# Patient Record
Sex: MALE | Race: BLACK OR AFRICAN AMERICAN | Employment: OTHER | URBAN - METROPOLITAN AREA
[De-identification: names, ages, dates, MRNs, and addresses within clinical notes are randomized per-mention and may not be internally consistent; named-entity substitution may affect disease eponyms.]

---

## 2018-08-21 ENCOUNTER — OFFICE VISIT (OUTPATIENT)
Dept: SURGERY | Age: 58
End: 2018-08-21

## 2018-08-21 VITALS
WEIGHT: 262.2 LBS | TEMPERATURE: 97.9 F | HEART RATE: 61 BPM | DIASTOLIC BLOOD PRESSURE: 96 MMHG | BODY MASS INDEX: 38.83 KG/M2 | SYSTOLIC BLOOD PRESSURE: 140 MMHG | HEIGHT: 69 IN | RESPIRATION RATE: 20 BRPM | OXYGEN SATURATION: 98 %

## 2018-08-21 DIAGNOSIS — Z79.4 TYPE 2 DIABETES MELLITUS WITHOUT COMPLICATION, WITH LONG-TERM CURRENT USE OF INSULIN (HCC): ICD-10-CM

## 2018-08-21 DIAGNOSIS — E66.01 CLASS 2 SEVERE OBESITY DUE TO EXCESS CALORIES WITH SERIOUS COMORBIDITY AND BODY MASS INDEX (BMI) OF 39.0 TO 39.9 IN ADULT (HCC): Primary | ICD-10-CM

## 2018-08-21 DIAGNOSIS — E11.9 TYPE 2 DIABETES MELLITUS WITHOUT COMPLICATION, WITH LONG-TERM CURRENT USE OF INSULIN (HCC): ICD-10-CM

## 2018-08-21 PROBLEM — E66.9 OBESITY, CLASS II, BMI 35-39.9: Status: ACTIVE | Noted: 2018-08-21

## 2018-08-21 RX ORDER — METFORMIN HYDROCHLORIDE 1000 MG/1
1000 TABLET ORAL 2 TIMES DAILY WITH MEALS
COMMUNITY
End: 2019-07-27

## 2018-08-21 RX ORDER — HYDROCHLOROTHIAZIDE 12.5 MG/1
12.5 TABLET ORAL DAILY
COMMUNITY
End: 2020-01-17

## 2018-08-21 NOTE — MR AVS SNAPSHOT
[address] [facility] [id number] 
[phone] Patient: Rena Lester 
MRN: [id number] :1960 Visit Information Date & Time Provider Department Dept. Phone Encounter #  
 2018 10:40 AM Hilaria Hogan MD [address] [address] [id number] Follow-up Instructions Routing History Upcoming Health Maintenance Date Due Hepatitis C Screening 1960 HEMOGLOBIN A1C Q6M 1962 LIPID PANEL Q1 1962 FOOT EXAM Q1 1972 MICROALBUMIN Q1 1972 EYE EXAM RETINAL OR DILATED Q1 1972 Pneumococcal 19-64 Medium Risk (1 of 1 - PPSV23) 1981 DTaP/Tdap/Td series (1 - Tdap) 1983 FOBT Q 1 YEAR AGE 50-75 2012 Influenza Age 5 to Adult 2018 Allergies as of 2018  Review Complete On: 2018 By: Hilaria Hogan MD  
 No Known Allergies Current Immunizations  Never Reviewed No immunizations on file. Not reviewed this visit You Were Diagnosed With   
  
 Codes Comments Class 2 severe obesity due to excess calories with serious comorbidity and body mass index (BMI) of 39.0 to 39.9 in adult [facility])    -  Primary ICD-10-CM: E66.01, Z68.39 ICD-9-CM: 278.01, V85.39 Type 2 diabetes mellitus without complication, with long-term current use of insulin (HCC)     ICD-10-CM: E11.9, Z79.4 ICD-9-CM: 250.00, V58.67 Vitals BP Pulse Temp Resp Height(growth percentile) Weight(growth percentile) (!) 140/96 61 97.9 °F (36.6 °C) (Oral) 20 5' 8.5\" (1.74 m) 262 lb 3.2 oz (118.9 kg) SpO2 BMI Smoking Status 98% 39.29 kg/m2 Never Smoker Vitals History BMI and BSA Data Body Mass Index Body Surface Area  
 39.29 kg/m 2 2.4 m 2 Your Updated Medication List  
  
   
This list is accurate as of 18 11:37 AM.  Always use your most recent med list.  
  
  
  
  
 aspirin delayed-release 81 mg tablet Take  by mouth daily. hydroCHLOROthiazide 12.5 mg tablet Commonly known as:  HYDRODIURIL Take 12.5 mg by mouth daily. LANTUS U-100 INSULIN 100 unit/mL injection Generic drug:  insulin glargine 20 Units by SubCUTAneous route once. Liraglutide 0.6 mg/0.1 mL (18 mg/3 mL) Pnij Commonly known as:  VICTOZA  
0.6 mg by SubCUTAneous route. lisinopril 2.5 mg tablet Commonly known as:  Milena Primmer Take  by mouth daily. metFORMIN 1,000 mg tablet Commonly known as:  GLUCOPHAGE Take 1,000 mg by mouth two (2) times daily (with meals). ZOCOR 40 mg tablet Generic drug:  simvastatin Take  by mouth nightly. We Performed the Following REFERRAL TO [facility] [id number] Custom] Referral Information Referral ID Referred By Referred To  
  
 [id number] Tarsha Corbett Not Available Visits Status Start Date End Date 1 Open 8/21/18 8/21/19 If your referral has a status of pending review or denied, additional information will be sent to support the outcome of this decision. Introducing [facility]! [employer] introduces Zoop patient portal. Now you can access parts of your medical record, email your doctor's office, and request medication refills online. 1. In your internet browser, go to [email or web address] 2. Click on the First Time User? Click Here link in the Sign In box. You will see the New Member Sign Up page. 3. Enter your Zoop Access Code exactly as it appears below. You will not need to use this code after youve completed the sign-up process. If you do not sign up before the expiration date, you must request a new code. · Zoop Access Code: [email or web address] Expires: 11/19/2018 11:37 AM 
 
4. Enter the last four digits of your Social Security Number (xxxx) and Date of Birth (mm/dd/yyyy) as indicated and click Submit. You will be taken to the next sign-up page.  
5. Create a Complixt ID. This will be your Ketto login ID and cannot be changed, so think of one that is secure and easy to remember. 6. Create a Ketto password. You can change your password at any time. 7. Enter your Password Reset Question and Answer. This can be used at a later time if you forget your password. 8. Enter your e-mail address. You will receive e-mail notification when new information is available in [address] 9. Click Sign Up. You can now view and download portions of your medical record. 10. Click the Download Summary menu link to download a portable copy of your medical information. If you have questions, please visit the Frequently Asked Questions section of the Ketto website. Remember, Ketto is NOT to be used for urgent needs. For medical emergencies, dial 911. Now available from your iPhone and Android! Please provide this summary of care documentation to your next provider. Your primary care clinician is listed as Sweetie Elaine. If you have any questions after today's visit, please call [phone].

## 2018-08-21 NOTE — PROGRESS NOTES
Bariatric Surgery Consult    [address] is a 66 y.o. male with a history of severe. His Height: 5' 8.5\" (174 cm), Weight: 262 lb 3.2 oz (118.9 kg). Body mass index is 39.29 kg/(m^2). He reports that he has been trying to lose weight for 10 years. His maximum weight was 270 pounds. He has viewed our online seminar. [place] [facility] wants to consider laparoscopic gastric bypass surgery. Pt is referred by [facility]. Dietary History:   The patient says that in the past, physician supervised, unsupervised diets, Atkins, [place] and prescription diet pills have not resulted in real success. When asked why he was not able to achieve or maintain significant weight loss he replied, \"tried the Atkins for 6 months but there was not a significant loss\". Number of meals per day: 3  Portion size: medium  Snacks: 2-3x daily, e.g. Fruit and crackers  Other dietary indiscretions:    Fast food--4x a week, e.g. Martha Tuesdays. He reports 3-4 months ago he stopped eating out as much. Soda--n/a   Sweets--1x weekly, e.g. candy    Comorbidities:     Bariatric comorbidities present: diabetes, hypertension, hypercholesterolemia and weight related arthopathies     STOPBANG questionnaire: DEFERRED because he reports he had a sleep study done 2 years ago and it was negative. Ambulatory status: independent    The patient's reported level of exercise: moderately active. He reports he tries to get on his bike everyday if his knees allow it.      Past Medical History:   Diagnosis Date    Class 2 obesity due to excess calories with serious comorbidity in adult 8/21/2018    DM (diabetes mellitus) ([facility] [address]     Dyslipidemia     Hyperlipidemia     Hypertension     Obesity, Class II, BMI 35-39.9 8/21/2018    Renal insufficiency      Past Surgical History:   Procedure Laterality Date    CARDIAC CATHETERIZATION        Current Outpatient Prescriptions   Medication Sig    hydroCHLOROthiazide (HYDRODIURIL) 12.5 mg tablet Take 12.5 mg by mouth daily.  Liraglutide (VICTOZA) 0.6 mg/0.1 mL (18 mg/3 mL) pnij 0.6 mg by SubCUTAneous route.  metFORMIN (GLUCOPHAGE) 1,000 mg tablet Take 1,000 mg by mouth two (2) times daily (with meals).  aspirin delayed-release 81 mg tablet Take  by mouth daily.  lisinopril (PRINIVIL, ZESTRIL) 2.5 mg tablet Take  by mouth daily.  simvastatin (ZOCOR) 40 mg tablet Take  by mouth nightly.  insulin glargine (LANTUS) 100 unit/mL injection 20 Units by SubCUTAneous route once. No current facility-administered medications for this visit.        No Known Allergies   Social History   Substance Use Topics    Smoking status: Never Smoker    Smokeless tobacco: Never Used    Alcohol use No      Family History   Problem Relation Age of Onset    Diabetes Mother     Diabetes Brother     Heart Attack Brother       Review of Systems:  A comprehensive review of 12 systems was negative except for:   Constitutional: Diabetes  Eyes: negative  Ears, nose, mouth, throat, and face: negative  Respiratory: negative  Cardiovascular: negative  Gastrointestinal: negative  Genitourinary: negative  Integument/breast: negative  Hematologic/lymphatic: negative  Musculoskeletal: back trouble  Neurological: negative  Behvioral/Psych: negative    Objective:     Visit Vitals    BP (!) 140/96    Pulse 61    Temp 97.9 °F (36.6 °C) (Oral)    Resp 20    Ht 5' 8.5\" (1.74 m)    Wt 262 lb 3.2 oz (118.9 kg)    SpO2 98%    BMI 39.29 kg/m2      Physical Exam:  General:  alert, cooperative, no distress   Eyes:  conjunctivae and sclerae normal   Throat & Neck: no erythema or exudates noted and neck supple and symmetrical; no palpable masses  [place] class 3    Lungs:   clear to auscultation bilaterally   Heart:  Regular rate and rhythm   Abdomen:   obese, abdomen is soft without significant tenderness, masses, organomegaly or guarding, normal bowel sounds   Extremities: extremities normal, atraumatic, trace edema in lower extremities. Skin: Normal.   Neuro: Mental status: Alert, oriented, thought content appropriate  Gait: Normal   Lymphatic: no cervical or supraclavicular adenopathy        Assessment:   Diagnoses and all orders for this visit:    1. Class 2 severe obesity due to excess calories with serious comorbidity and body mass index (BMI) of 39.0 to 39.9 in adult ([facility] [address]  -     REFERRAL TO [address] [place] [address]    2. Type 2 diabetes mellitus without complication, with long-term current use of insulin ([facility])  -     [facility]    Severe (Body mass index is 39.29 kg/(m^2). ) with comorbidities including: diabetes, hypertension, hypercholesterolemia and weight related arthopathies. The patient meets criteria established by the NIH for weight loss surgery candidates. Without weight reduction, co-morbidities will escalate as well as increase risk of early mortality. Our recommendation is the patient could be served with laparoscopic gastric bypass surgery. I explained to the patient differences between laparoscopic gastric bypass, and laparoscopic vertical sleeve gastrectomy with respect to expected weight loss, resolution of comorbidities and risks. Mr. Deisy Pederson has viewed our online seminar and has seen our educational materials. He has requested Dr. Isabella Montalvo to perform his procedure. I reviewed the role for this procedure as a tool to help him achieve his weight loss goals. I reminded him that effective weight loss comes from lifelong adherence to changes in dietary choices, eating habits and exercise. Recommendation: We will initiate process for eligibility for laparoscopic gastric bypass surgery. He must complete insurance mandated diet and exercise counseling.  We also  recommend that the patient undergo the following evaluations prior to considering surgery:    Cardiology: n/a  Dietician: yes  Diabetes Center: yes  Gastroenterology: n/a  Psychiatry/Psychology: yes  Pulmonology: n/a  Sleep Medicine: n/a    Total face to face time with patient: 16 minutes. Greater than 50% of the time was spent in counseling.  11:14 AM - 11:30 AM  Chart was written by Fabiola Atkinson, as dictated by Monica Gallagher MD.      Signed By: Chele Lopez MD     August 21, 2018

## 2018-08-21 NOTE — PROGRESS NOTES
1. Have you been to the ER, urgent care clinic since your last visit? Hospitalized since your last visit? No    2. Have you seen or consulted any other health care providers outside of the [facility] since your last visit? Include any pap smears or colon screening. Kyra Mcintyre  Body composition    male  66 y.o. Vitals:    08/21/18 1053   Weight: 262 lb 3.2 oz (118.9 kg)   Height: 5' 8.5\" (1.74 m)     Body mass index is 39.29 kg/(m^2). Jyoti Prashant Neck-18.25 inches  Waist-48.5 inches  Hips-47.5 inches  Frame size-7 medium

## 2018-09-06 ENCOUNTER — CLINICAL SUPPORT (OUTPATIENT)
Dept: SURGERY | Age: 58
End: 2018-09-06

## 2018-09-06 DIAGNOSIS — E66.01 MORBID OBESITY WITH BMI OF 40.0-44.9, ADULT (HCC): Primary | ICD-10-CM

## 2018-09-11 ENCOUNTER — OP HISTORICAL/CONVERTED ENCOUNTER (OUTPATIENT)
Dept: OTHER | Age: 58
End: 2018-09-11

## 2018-09-11 VITALS — BODY MASS INDEX: 40.61 KG/M2 | WEIGHT: 271 LBS

## 2018-09-11 NOTE — PROGRESS NOTES
Pre-operative Bariatric Nutrition Evaluation ()     Date: 2018   Alea Koenig M.D. Name: Elizabeth Flor  :  1960  Age:  66  Gender: Male   Type of Surgery: [x]           Gastric Bypass  []           LAGB  []           Sleeve Gastrectomy    ASSESSMENT:    Past Medical History:HTN, Type II DM, high cholesterol, arthritis, depression, low vitamin D      Medications/Supplements:   Prior to Admission medications    Medication Sig Start Date End Date Taking? Authorizing Provider   hydroCHLOROthiazide (HYDRODIURIL) 12.5 mg tablet Take 12.5 mg by mouth daily. Historical Provider   Liraglutide (VICTOZA) 0.6 mg/0.1 mL (18 mg/3 mL) pnij 0.6 mg by SubCUTAneous route. Historical Provider   metFORMIN (GLUCOPHAGE) 1,000 mg tablet Take 1,000 mg by mouth two (2) times daily (with meals). Historical Provider   aspirin delayed-release 81 mg tablet Take  by mouth daily. Historical Provider   lisinopril (PRINIVIL, ZESTRIL) 2.5 mg tablet Take  by mouth daily. Historical Provider   simvastatin (ZOCOR) 40 mg tablet Take  by mouth nightly. Historical Provider   insulin glargine (LANTUS) 100 unit/mL injection 20 Units by SubCUTAneous route once. Historical Provider       Food Allergies/Intolerances:none    Anthropometrics:    Ht:68.5\"   Wt: 271#    IBW: 157#     %IBW: 172%     BMI:40     Category: obesity III     Reported wt history:Pt presents today for pre-op nutrition evaluation for wt loss surgery. Reports lowest adult BW was 150# at age 27 and highest adult BW was 275#. Attributes wt gain r/t decreased physical activity after retiring from the [employer] along with emotional eating and medical conditions that have caused wt gain. Pt has attempted wt loss through various methods with most significant wt loss of 10#. Has been unable to achieve more long term or significant wt loss and is now seeking approval for wt loss surgery.  Is motivated to lose weight to improve his health as he has had several family members pass away from weight related conditions. Pt will need to complete 6 months supervised weight loss for insurance requirements. Exercise/Physical Activity:bike riding for 5-10 miles, 3-4 times per week; weight training for 1 hour, 3-4 times per week    Reported Diet History:Atkins, diet pills, fasting, [place], exercise     24 Hour Diet Recall  Breakfast  Cereal with almond milk; eggs, cheese, turkey hodges on wknds   Lunch  Fast food or leftovers    Dinner  Fried chicken or fish, veggies; h/o ordering out pizza - recently cutting back on eating out    Snacks  Cookies, fruit, crackers, peanuts, candy    Beverages  Water,unsweetened tea with Weldon Copping      A pre-op nutrition checklist was reviewed. Patient checked off 5 of 15 items. Environment/Psychosocial/Support:pt's wife and daughter are listed as primary support system and rates level of support a 10 out of 10. Pt works full time and shares grocery shopping and cooking with his wife. NUTRITION DIAGNOSIS:  1. Excessive energy intake r/t food preferences evidenced by high caloric density food and snack choices. Heavy reliance on fast food/restaurant/take out meals with previous frequency of 4-5 times per week. Has recently started cutting back. 2. Food and nutrition related knowledge deficit r/t lack of prior exposure to information evidenced by pt seeking nutrition education in preparation for gastric bypass. NUTRITION INTERVENTION:  Pt educated on nutrition recommendations for weight loss surgery, specifically gastric bypass. Instructed on consuming 3 meals per day starting now. Use the balanced plate method to plan meals, include 3 oz of lean source of protein, 1/2 cup whole grains, unlimited non-starchy vegetables, 1/2 cup fruit and 1 serving of low fat dairy. Utilize handouts listing healthy snack and meal ideas to limit restaurant meals. After surgery measure all meals to 1/2 cup.  Each meal will contain a 1/4 cup lean protein and 1/4 cup fruit, non-starchy vegetable or starch (limiting to once per day). Aim for 60 g protein per day. Sip on 48-64 oz of sugar free, calorie free, non-carbonated beverages each day. Do not use a straw. Do not consume beverages 30 minutes before, during or 30 minutes after meals. Read all nutrition labels. Demonstrated and emphasized identifying serving size, total fat, sugar and protein content. Defined low fat as </= 3 g per serving. Discussed lean and extra lean sources of protein. Provided list of low fat cooking methods. Avoid foods with sugar listed in the first 3 ingredients and >/15 g sugar per serving. Excess sugar/fat intake may lead to dumping syndrome. Discussed signs and symptoms of dumping syndrome. Practice mindful eating habits; take small bites, chew thoroughly, avoid distractions, utilize hunger/fullness scale. Consume meals over 20-30 minutes. Attend Bariatric Support Group and increase physical activity (approved per MD) for long term weight maintenance. NUTRITION MONITORING AND EVALUATION:    The following goals were established with patient;  1. Continue to decrease reliance on fast food/take out meals. More cooking/prepping from home. Eventually limit eating out to only 2 times per week for best wt loss. 2. Improve overall food and snack choices. Include more lean protein, fruits, veggies, whole grains and low-fat dairy. 3. Maintain exercise and increase as tolerated. 4. Review nutrition guidelines for gastric bypass. Follow up next month for continued education and supervised weight loss. Specific tips and techniques to facilitate compliance with above recommendations were provided and discussed. Pt was strongly encourage to begin making necessary changes now, attend support group, and re-visit the dietitian prn. Nutrition evaluation reveals pt with some lifestyle changes in preparation for weight loss surgery.  Goals set and recommendations made. Will continue to assess as pt works to complete supervised weight loss requirements. If further details are desired please feel free to contact me at [phone]. This phone number was also provided to the patient for any further questions or concerns.            Cyndy Hall RD

## 2018-10-03 ENCOUNTER — CLINICAL SUPPORT (OUTPATIENT)
Dept: SURGERY | Age: 58
End: 2018-10-03

## 2018-10-03 VITALS — WEIGHT: 271 LBS | BODY MASS INDEX: 40.61 KG/M2

## 2018-10-03 DIAGNOSIS — E66.01 MORBID OBESITY WITH BMI OF 40.0-44.9, ADULT (HCC): Primary | ICD-10-CM

## 2018-10-03 NOTE — PROGRESS NOTES
[address] [facility] [address]  Super[facility]     Date:   10/3/2018    Patient's Name: Maricruz Orourke  : 1960    Insurance:  [facility]          Session:   Surgery: Gastric Bypass  Surgeon:  Jay Melgar M.D. Height: 68.5\"  Weight:    271      Lbs. BMI: 40   Pounds Lost since last month: 0               Pounds Gained since last month: 0    Starting Weight: 271#   Previous Months Weight: 271#  Overall Pounds Lost: 0  Overall Pounds Gained: 0    Other Pertinent Information: n/a     Smoking Status:  none  Alcohol Intake: 2-3 drinks once a month     I have reviewed with pt the guidelines of the supervised wt loss class. Pt understands the expectations of some wt loss during the wt loss trial.  Pt understands that wt gain could delay the process. I have also expressed to pt that classes need to be consecutive. Missing a class may subject pt to have to start over. Pt has received this information in writing. Changes that patient has made since last month include:  None reported. Eating Habits and Behaviors  A review of the general nutrition guidelines in preparation for weight loss surgery was provided. Pts were instructed that their plate should be made up 1/2 plate coming from non-starchy vegetables, 1/4 coming from lean meat, and 1/4 of their plate coming from carbohydrates, including fruits, starches, or milk. We discussed measuring meals to 1/2 cup total per meal after surgery. Emphasis was placed on the importance of eating 3 meals a day and aiming for 60 grams of protein per day and drinking only calorie-free, sugar-free and non-carbonated beverages. We discussed the importance of drinking 64 ounces of fluid per day to prevent dehydration post-operatively. Patient's current diet habits include: eating 2-3 meals per day. Snacking on fruit.  Eating crackers twice in the past month and cereal. Does not answer question for sweets/desserts intake. Eating a mixture of baked, grilled, broiled and some fried foods. Eating out is 1-3 times per week. Drinking 64 oz water, 24 oz unsweetened tea. Sometimes emotional eating and denies situational eating. Eating most meals while watching television or at work and takes 10-15 minutes to finish the meal. Reports grazing, lack of activity and food choices are biggest barriers to weight loss at this time. Physical Activity/Exercise  An education lesson specific to exercise was provided this month. We talked about the importance of increasing daily physical activity and beginning to develop an exercise regimen/routine. We discussed barriers to exercise and ways to work around those barriers. We talked about exercise as being an important part of long term weight loss after surgery. Comments:  During class, I discussed with patient the importance of getting into an exercise routine. Pt is currently officiating high school sports for activity. Pt has been encouraged to implement more routine/intentional exercise. Behavior Modification       We talked about how to eat more mindfully and identify emotional eating triggers. Tips and recommendations for how to make these changes were provided. Pt was encouraged to keep a food journal and record what they were taking in daily. Overall Assessment: Patient demonstrates appropriate lifestyle changes in preparation for weight loss surgery evidenced by weight maintenance. Continued changes are indicated. Will continue to assess as pt works to complete supervised weight loss requirements. Patient-Set Goals:   1. Nutrition - less sugar, increase protein  2. Exercise - bike, walking   3.  Behavior -less stress    Bianca Stearns, RAVEN  10/3/2018

## 2018-11-07 ENCOUNTER — CLINICAL SUPPORT (OUTPATIENT)
Dept: SURGERY | Age: 58
End: 2018-11-07

## 2018-11-07 DIAGNOSIS — E66.01 MORBID OBESITY WITH BMI OF 40.0-44.9, ADULT (HCC): Primary | ICD-10-CM

## 2018-11-08 VITALS — BODY MASS INDEX: 40.46 KG/M2 | WEIGHT: 270 LBS

## 2018-11-08 NOTE — PROGRESS NOTES
[id number] [facility] Surgery at [facility]  Supervised Weight Loss     Date:   2018    Patient's Name: Steve Michel  : 1960    Insurance:  [facility]          Session: 3 of  6  Surgery: Gastric Bypass  Surgeon:  Frederick Hein M.D. Height: 68.5\"  Weight:    270      Lbs. BMI: 40   Pounds Lost since last month: 1#               Pounds Gained since last month: 0    Starting Weight: 271#   Previous Months Weight: 271#  Overall Pounds Lost: 1#  Overall Pounds Gained: 0    Other Pertinent Information: n/a     Smoking Status:  none  Alcohol Intake: 1 or 2 drinks, once per week    I have reviewed with pt the guidelines of the supervised wt loss program.  Pt understands the expectations of some wt loss during the program and that wt gain could delay the process. I have also explained that classes need to be consecutive. Missing a class may result in starting over. Pt has received this information in writing. Changes that patient has made since last month include:  Medication compliance to improve A1c from 11.4% down to 7.6%, eating less sweets. Eating Habits and Behaviors  A nutrition lesson specific to portion control and meal planning was presented. Emphasis was placed on general healthy eating using the balanced plate method. Pts were instructed that their plate should be made up 1/2 plate coming from non-starchy vegetables, 1/4 coming from lean meat, and 1/4 of their plate coming from carbohydrates, including fruits, starches, or milk. We discussed measuring meals to 1/2 cup total per meal after surgery. Emphasis was placed on the importance of eating 3 meals a day and aiming for 60 grams of protein per day and drinking only calorie-free, sugar-free and non-carbonated beverages. We discussed the importance of drinking 64 ounces of fluid per day to prevent dehydration post-operatively. Patient's current diet habits include: eating 2 meals per day.  Snacking on fruit. Eating cereal daily. Does not report intake/frequency of sweets/desserts. Eating baked, grilled, broiled foods. Eating out is 1-3 times per week. Drinking 60 oz water. Sometimes emotional eating. Packing meals when away from home. Eating most meals while watching television and takes 10-15 minutes to finish the meal. Reports lack of activity as biggest barrier to wt loss at this time. Physical Activity/Exercise  We talked about the importance of increasing daily physical activity and beginning to develop an exercise regimen/routine. We talked about exercise as being an important part of long term weight loss after surgery. Comments:  During class, I discussed with patient the importance of getting into an exercise routine. Pt is currently not exercising stating \"lack of time/scheduling\"  for activity. Pt has been encouraged to make time for exercise and eventually work up to 150 minutes per week to promote wt loss. Behavior Modification       We talked about how to eat more mindfully and identify emotional eating triggers. Tips and recommendations for how to make these changes were provided. Pt was encouraged to keep a food journal and record what they were taking in daily. Overall Assessment: Patient demonstrates appropriate lifestyle changes in preparation for weight loss surgery evidenced by reported changes and weight loss. Continued changes are indicated specific to exercise and mindful eating behaviors. Will continue to assess as pt works to complete supervised weight loss requirements. Patient-Set Goals:   1. Nutrition - smaller portions  2. Exercise - early morning exercise for 45 minutes  3.  Behavior -consume less sugar     Rosaura Cano, RD  11/8/2018

## 2018-12-12 ENCOUNTER — CLINICAL SUPPORT (OUTPATIENT)
Dept: SURGERY | Age: 58
End: 2018-12-12

## 2018-12-12 DIAGNOSIS — E66.01 MORBID OBESITY WITH BMI OF 40.0-44.9, ADULT (HCC): Primary | ICD-10-CM

## 2018-12-14 VITALS — WEIGHT: 268 LBS | BODY MASS INDEX: 40.16 KG/M2

## 2018-12-14 NOTE — PROGRESS NOTES
[facility] Surgery at [facility]  Supervised Weight Loss     Date:   2018     Patient's Name: Charis Wolfe  : 1960    Insurance:  [facility]          Session:   Surgery: Gastric Bypass  Surgeon:  Alexus Salazar M.D. Height: 68.5\"  Weight:    268      Lbs. BMI: 40   Pounds Lost since last month: 2#               Pounds Gained since last month: 0    Starting Weight: 271#   Previous Months Weight: 270#  Overall Pounds Lost: 3#  Overall Pounds Gained: 0    Other Pertinent Information: n/a     Smoking Status:  Not reported  Alcohol Intake: not reported    I have reviewed with pt the guidelines of the supervised wt loss program.  Pt understands the expectations of some wt loss during the program and that wt gain could delay the process. I have also explained that classes need to be consecutive. Missing a class may result in starting over. Pt has received this information in writing. Changes that patient has made since last month include:  No changes reported. Eating Habits and Behaviors  A nutrition lesson specific to protein was presented. Emphasis was placed on importance of getting 60 grams of protein per day. We discussed various food sources of protein, use of protein shakes and eating protein first at the meal. General healthy eating guidelines were also discussed. Pts were instructed that their plate should be made up 1/2 plate coming from non-starchy vegetables, 1/4 coming from lean meat, and 1/4 of their plate coming from carbohydrates, including fruits, starches, or milk. We discussed measuring meals to 1/2 cup total per meal after surgery. Drinking only calorie-free, sugar-free and non-carbonated beverages. We discussed the importance of drinking 64 ounces of fluid per day to prevent dehydration post-operatively. Patient's current diet habits include: eating 2-3 meals per day. Snacking on fruit.  Does not report intake of refined carbohydrates, sweets, desserts. Eating cookies 1-2 times per month. Eating a mixture of baked, grilled, broiled and some fried foods. Eating out is not reported. Drinking 120 oz water. Sometimes emotional eating and denies situational eating. Packing meals when away from home. Eating most meals while watching television and takes 15 minutes to finish the meal. Reports food choices and portion sizes are biggest barriers to weight loss. Physical Activity/Exercise  We talked about the importance of increasing daily physical activity and beginning to develop an exercise regimen/routine. We talked about exercise as being an important part of long term weight loss after surgery. Comments:  During class, I discussed with patient the importance of getting into an exercise routine. Pt is currently \"officiating high school basketball\" for activity. Pt has been encouraged to maintain and increase exercise. Behavior Modification       We talked about how to eat more mindfully and identify emotional eating triggers. Tips and recommendations for how to make these changes were provided. Pt was encouraged to keep a food journal and record what they were taking in daily. Overall Assessment: Patient demonstrates some small changes mostly evidenced by weight loss as he does not report any lifestyle changes made. Pt does not answer all questions on diet/lifestyle questionnaire making it difficult to fully assess compliance with recommended changes. Will continue to assess. Patient-Set Goals:   1. Nutrition - less fried foods   2. Exercise - run/walk 5 miles non-stop  3.  Behavior -no goal set     Chuy Jack, [address] [place] [address]  12/14/2018

## 2019-01-15 ENCOUNTER — CLINICAL SUPPORT (OUTPATIENT)
Dept: SURGERY | Age: 59
End: 2019-01-15

## 2019-01-15 VITALS — BODY MASS INDEX: 39.71 KG/M2 | WEIGHT: 265 LBS

## 2019-01-15 DIAGNOSIS — E66.9 OBESITY (BMI 30-39.9): Primary | ICD-10-CM

## 2019-01-15 NOTE — PROGRESS NOTES
[id number] [facility] Surgery at [facility]  Supervised Weight Loss     Date:   1/15/2019    Patient's Name: Sabrina Ferrari  : 1960    Insurance:  [facility]          Session:   Surgery: Gastric Bypass  Surgeon:  Marely Soni M.D. Height: 68.5\"  Weight:    265      Lbs. BMI: 39   Pounds Lost since last month: 3#               Pounds Gained since last month: 0    Starting Weight: 271#   Previous Months Weight: 268#  Overall Pounds Lost: 6#  Overall Pounds Gained: 0    Other Pertinent Information: n/a    Smoking Status:  none  Alcohol Intake: 1 drink, every other week    I have reviewed with pt the guidelines of the supervised wt loss program.  Pt understands the expectations of some wt loss during the program and that wt gain could delay the process. I have also explained that classes need to be consecutive. Missing a class may result in starting over. Pt has received this information in writing. Changes that patient has made since last month include:  Lower carb intake, more protein, less eating out. Eating Habits and Behaviors  A nutrition lesson specific to vitamins was provided. We discussed the various reasons for needing vitamins and different types and doses. General healthy eating guidelines were also discussed. Pts were instructed that their plate should be made up 1/2 plate coming from non-starchy vegetables, 1/4 coming from lean meat, and 1/4 of their plate coming from carbohydrates, including fruits, starches, or milk. We discussed measuring meals to 1/2 cup total per meal after surgery. Drinking only calorie-free, sugar-free and non-carbonated beverages. We discussed the importance of drinking 64 ounces of fluid per day to prevent dehydration post-operatively. Patient's current diet habits include: eating 2-3 meals per day. Snacking on fruits and nuts. Eating cereal 5 times per week and no sweets/desserts.  Eating mixture of baked, grilled, broiled and fried foods. Eating out is 1-3 times per week. Drinking 64 oz water, 8 oz unsweetened tea. Reports sometimes emotional eating and reports puzzles as a non-food coping strategy. Denies situational eating. Is not packing meals when away from home. Eating most meals at a table and takes 10-15 minutes to finish the meal. Reports food choices and portion sizes are biggest barriers to wt loss at this time. Physical Activity/Exercise  We talked about the importance of increasing daily physical activity and beginning to develop an exercise regimen/routine. We talked about exercise as being an important part of long term weight loss after surgery. Comments:  During class, I discussed with patient the importance of getting into an exercise routine. Pt is currently not exercising stating \"nerve damage in shoulder\" that prevents activity. Pt has been encouraged to consider seated/chair exercises or walking. Behavior Modification       We talked about how to eat more mindfully and identify emotional eating triggers. Tips and recommendations for how to make these changes were provided. Pt was encouraged to keep a food journal and record what they were taking in daily. Overall Assessment: Patient demonstrates appropriate lifestyle changes in preparation for weight loss surgery evidenced by reported changes and weight loss. Continued changes are indicated. Will continue to assess as pt works to complete supervised weight loss requirements. Patient-Set Goals:   1. Nutrition - eating healthier, portion control   2. Exercise - walking and running   3.  Behavior -sipping water slowly    Poonam Rosenberg, RD  1/15/2019

## 2019-02-13 ENCOUNTER — CLINICAL SUPPORT (OUTPATIENT)
Dept: SURGERY | Age: 59
End: 2019-02-13

## 2019-02-13 VITALS — BODY MASS INDEX: 39.41 KG/M2 | WEIGHT: 263 LBS

## 2019-02-13 DIAGNOSIS — E66.9 OBESITY (BMI 30-39.9): Primary | ICD-10-CM

## 2019-02-13 NOTE — PROGRESS NOTES
[id number] [facility] Surgery at [facility]  Supervised Weight Loss     Date:   2019    Patient's Name: Rachel Ellis  : 1960    Insurance:  [facility]          Session:   Surgery: Gastric Bypass  Surgeon:  Vini Le M.D. Height: 68.5\"  Weight:    263      Lbs. BMI: 39   Pounds Lost since last month: 2#               Pounds Gained since last month: 0    Starting Weight: 271#   Previous Months Weight: 265#  Overall Pounds Lost: 8#  Overall Pounds Gained: 0    Other Pertinent Information: n/a     Smoking Status:  none  Alcohol Intake: not reported    I have reviewed with pt the guidelines of the supervised wt loss program.  Pt understands the expectations of some wt loss during the program and that wt gain could delay the process. I have also explained that classes need to be consecutive. Missing a class may result in starting over. Pt has received this information in writing. Changes that patient has made since last month include:  More exercise, more protein intake, increased fluid intake. Eating Habits and Behaviors  General healthy eating guidelines were also discussed. Pts were instructed that their plate should be made up 1/2 plate coming from non-starchy vegetables, 1/4 coming from lean meat, and 1/4 of their plate coming from carbohydrates, including fruits, starches, or milk. We discussed measuring meals to 1/2 cup total per meal after surgery. Drinking only calorie-free, sugar-free and non-carbonated beverages. We discussed the importance of drinking 64 ounces of fluid per day to prevent dehydration post-operatively. Patient's current diet habits include: eating 2-3 meals per day. Snacking on fruit and crackers. Eating crackers and cereal 1-2 times per week. Eating a mixture of baked, grilled, broiled and some fried foods. Eating out is 1-2 times per month. Drinking 60 oz water, 16 oz unsweetened tea.  Denies emotional and situational eating. Is not packing meals when away from home. Eating most meals while watching television and takes 15 minutes to finish the meal.         Physical Activity/Exercise  We talked about the importance of increasing daily physical activity and beginning to develop an exercise regimen/routine. We talked about exercise as being an important part of long term weight loss after surgery. Comments:  During class, I discussed with patient the importance of getting into an exercise routine. Pt is currently officiating high school basketball for activity. Pt has been encouraged to maintain and increase as tolerated. Eventually will need to incorporate additional exercise during off seasons. Behavior Modification       A behavior modification lesson was provided with an emphasis on developing mindful eating behaviors. We talked about how to eat more mindfully and identify emotional eating triggers. Tips and recommendations for how to make these changes were provided. Pt was encouraged to keep a food journal and record what they were taking in daily. Overall Assessment: Patient demonstrates appropriate lifestyle changes in preparation for weight loss surgery evidenced by reported changes and weight loss. Demonstrates fairly good understanding of general nutrition guidelines and appears to be an appropriate candidate for surgery at this time. Patient-Set Goals:   1. Nutrition - continue with protein increase   2. Exercise - increase time and length of exercise   3.  Behavior -eat at the dinner table, be more mindful    Robert Escamilla, RAVEN  2/13/2019

## 2019-04-23 ENCOUNTER — HOSPITAL ENCOUNTER (OUTPATIENT)
Dept: DIABETES SERVICES | Age: 59
Discharge: HOME | End: 2019-04-23
Payer: OTHER GOVERNMENT

## 2019-04-23 PROCEDURE — G0108 DIAB MANAGE TRN  PER INDIV: HCPCS

## 2019-04-23 NOTE — DIABETES MGMT
[facility]   Pre[facility]operative Bariatric Diabetes Assessment     2019    Referring Physician/Surgeon:   Radha Cerda M.D.   NAME: Ronna Perez : 1960 AGE: 64 y.o. GENDER: male  REASON FOR VISIT:  laparoscopic gastric bypass surgery    Assessment:        Past Medical History:   Diagnosis Date    Class 2 obesity due to excess calories with serious comorbidity in adult 2018    DM (diabetes mellitus) ([facility] [address]     Dyslipidemia     Hyperlipidemia     Hypertension     Obesity, Class II, BMI 35-39.9 2018    Renal insufficiency        Diabetes Medications:   Aleshia Stevens is currently taking 90 units of Lantus in the AM, 1.8 mg of Victoza in the AM and 1000 mg of Metformin in the AM and PM. He reports that he may miss his medication once a week, but he normally does not forget to take it. We reviewed the mechanism of action of each of his medications as well as the best way to take his medications. He has been taking 80 units of Lantus in one shot and 10 units right after. He agreed to begin taking 45 and 45 to help him with absorption. We reviewed proper site rotation as well as the importance of cleaning the sites. He reports some signs of lipohypertrophy, for which we reviewed appropriate locations in inject insulin. Vitamins:   None reported at this time    Food Allergies/Intolerances: Patient reports an intolerance to most pork products    Exercise/Physical Activity: Mr. Ines Schwartz reports that he is exercising every day. He will either ride his bike (outside) for an hour or lift weight (3x/week) for 30-45 minutes. He is lifting weight to \"help tighten his skin\" pre-surgery. We reviewed the benefits of exercise on his BG and he was encouraged to continue with the same lifestyle modifications moving forward.      Anthropometrics:   19  Height: 69 inches  Weight: 265.8 lb   BMI: 39.25 kg/M2       Laboratory:  His POC A1C in the office today was 7.5%, down from 9.4% in March of 2019. We reviewed his results with him, as well as what it represents and his target range. He verbalized understanding. Reported Diabetes History:  Pt unsure when he was diagnosed with diabetes, however stated he has had it for at least 12 years now. He is unsure of his highest A1C, however he knows in March it was elevated. He has multiple family members that have diabetes, and reports that he is angry about his diabetes as he is lost his baby brother and mother to diabetes. Aramis Ardon reports checking his BG once a day, always fasting in the morning. He is using the Precision Xtra meter, however did not bring his meter or log book for review today. He self reported that his fasting number this morning was 97 mg/dL and his 7 day average is 107 mg/dL. He stated the highest number he has seen recently is 232 mg/dL because he ate a lot of pizza the night before. He is not currently keeping a log book but understands the benefit of this and agreed to begin keeping a log book. He is to email me his values from the last 7-10 days for evaluation. We review his target ranges for pre and post meals and he verbalized understanding. We also discussed the various factors that could be affecting his BG.     24 hour Diet recall:  Breakfast: Cereal with almond milk  Lunch: Sometimes he skips lunch (if he has it he will have left overs from the night before)  Dinner: Trying to follow MyPlate method that he learned from Shayy Singh (including a protein + carb + vegetable)  Snacks: mostly snacking on fruits (banana, apples, etc)  Beverages: Water, Gatorade zero (no SSB)    **Patient reports that he has been eating the same thing almost every day. Weekends he reports he is eating differently due to not being at work. Him and his wife are both in the process of getting the bariatric surgery and so they are making these lifestyle modifications together.  We reviewed the importance of him not skipping meals, as he tends to over eat when he skips meals. We spent a good portion of today's visit helping him identify all sources of food that contain carbohydrates as he was not sure of all foods. Bryce Bertrand likes to bake, however has substituted his sugar for sugar substitute to help control his BG values. One of his biggest concerns is that he is eating while watching TV--he understands how this may lead to over consumption of food and agreed to be more mindful of this moving forward. Nutrition Diagnosis:  Inconsistent carbohydrate intake consistent with lack of knowledge on carbohydrate foods supported by pt reported diet recall as well as patient skipping meals         Nutrition/Diabetes Intervention:  Participant educated on diabetes recommendations prior to weight loss surgery, specifically:     .  -Instructed on consuming 3 meals per day, consistent in carbohydrate, starting now. He is to have between 45-60 grams of carbs per meal. Use the balanced plate method along with carbohydrate counting to plan meals and  Include lean protein at each meal and include 1/2 plate of non starchy vegetables at lunch and dinner. Limit snacks to when hungry or > 6 hours without a meal. He was given a snack list of appropriate snack options moving forward. -Use only sugar  free, calorie free, non-carbonated beverages each day. Do not use a straw. Provided written guidelines and acceptable list of protein supplements.     -Read all nutrition labels and measure all foods. Demonstrated and emphasized identifying serving size, total carbohydrate and fiber. Provided written resource for unlabeled foods and online resources for dining out. Reese Vital was able to return demonstration of reading food labels with minimal assistance.    -Discussed how to properly treat a low blood sugar using the Rule of 15. Reese Vital reports having 1-2 episodes of hypoglycemia in the last month. He treated his lows with 2 glucose tablets and was not re-checking his BG.  We reviewed the proper treatment method to include the importance of re-checking BG to ensure it is within target range. He verbalized understanding.    -Reviewed blood sugar goals pre and post meal along with frequency of testing. Participant to check blood glucose levels 1-2 times per day and to continue all diabetes medications unless instructed otherwise by MD.      Monitoring and Evalution:    After review of General Eating Tips handout participant chose to add the following goals:  1) I eat breakfast, lunch and dinner. 2) I avoid distractions while eating. 3) I measure all foods. Pt able to check off 12 out of 16 pre surgery check list items at conclusion of our visit today. Plan:     Specific tips and techniques to facilitate compliance with above recommendations were provided and discussed. Participant was encouraged to begin making necessary changes now       [x]      Participant appears appropriate for surgery at this time       My phone number and e-mail was provided to the participant for any further questions or concerns.       [address]

## 2019-05-29 ENCOUNTER — OFFICE VISIT (OUTPATIENT)
Dept: SURGERY | Age: 59
End: 2019-05-29

## 2019-05-29 VITALS
TEMPERATURE: 98.3 F | DIASTOLIC BLOOD PRESSURE: 87 MMHG | HEART RATE: 74 BPM | HEIGHT: 69 IN | RESPIRATION RATE: 18 BRPM | BODY MASS INDEX: 38.95 KG/M2 | OXYGEN SATURATION: 98 % | WEIGHT: 263 LBS | SYSTOLIC BLOOD PRESSURE: 143 MMHG

## 2019-05-29 DIAGNOSIS — Z79.4 TYPE 2 DIABETES MELLITUS WITHOUT COMPLICATION, WITH LONG-TERM CURRENT USE OF INSULIN (HCC): ICD-10-CM

## 2019-05-29 DIAGNOSIS — E11.9 TYPE 2 DIABETES MELLITUS WITHOUT COMPLICATION, WITH LONG-TERM CURRENT USE OF INSULIN (HCC): ICD-10-CM

## 2019-05-29 DIAGNOSIS — E66.01 CLASS 2 SEVERE OBESITY DUE TO EXCESS CALORIES WITH SERIOUS COMORBIDITY AND BODY MASS INDEX (BMI) OF 39.0 TO 39.9 IN ADULT (HCC): Primary | ICD-10-CM

## 2019-05-29 NOTE — LETTER
5/29/19 Patient: Rena Lester  
YOB: 1960 Date of Visit: 5/29/2019 Jacqueline Huang MD 
[address] [id number] [phone] [facility] [address] VIA Facsimile: [phone] Dear Jacqueline Huang MD, Thank you for referring [facility] for evaluation. My notes for this consultation are attached. If you have questions, please do not hesitate to call me. I look forward to following your patient along with you. Sincerely, Josi Cortes MD

## 2019-05-29 NOTE — PROGRESS NOTES
Bariatric Surgery Consult    [address] Palumbo is a 64 y.o. male with a history of morbid obesity. His Height: 5' 8.5\" (174 cm), Weight: 263 lb (119.3 kg). Body mass index is 39.41 kg/m². He reports that he has been trying to lose weight for 5 years. His maximum weight was 280 pounds. He has attended our bariatric surgery information seminar. Renetta Pack wants to consider laparoscopic gastric bypass surgery. Pt is referred by:  Edna Beltran MD.      Comorbidities:     Bariatric comorbidities present: hypertension, insulin dependent diabetes and obstructive sleep apnea    Ambulatory status: independent    The patient's reported level of exercise: moderately active. Patient Active Problem List    Diagnosis Date Noted    Class 2 obesity due to excess calories with serious comorbidity in adult 08/21/2018    Obesity, Class II, BMI 35-39.9 08/21/2018    Hypertension     DM (diabetes mellitus) ([address]     Hyperlipidemia     Renal insufficiency     Dyslipidemia      Past Medical History:   Diagnosis Date    Class 2 obesity due to excess calories with serious comorbidity in adult 8/21/2018    [facility]     Dyslipidemia     Hyperlipidemia     Hypertension     Obesity, Class II, BMI 35-39.9 8/21/2018    Renal insufficiency       Past Surgical History:   Procedure Laterality Date    CARDIAC CATHETERIZATION        Social History     Tobacco Use    Smoking status: Never Smoker    Smokeless tobacco: Never Used   Substance Use Topics    Alcohol use: No      Family History   Problem Relation Age of Onset    Diabetes Mother     Diabetes Brother     Heart Attack Brother       . Current Outpatient Medications   Medication Sig    hydroCHLOROthiazide (HYDRODIURIL) 12.5 mg tablet Take 12.5 mg by mouth daily.  Liraglutide (VICTOZA) 0.6 mg/0.1 mL (18 mg/3 mL) pnij 0.6 mg by SubCUTAneous route.  metFORMIN (GLUCOPHAGE) 1,000 mg tablet Take 1,000 mg by mouth two (2) times daily (with meals).     aspirin delayed-release 81 mg tablet Take  by mouth daily.  lisinopril (PRINIVIL, ZESTRIL) 2.5 mg tablet Take  by mouth daily.  simvastatin (ZOCOR) 40 mg tablet Take  by mouth nightly.  insulin glargine (LANTUS) 100 unit/mL injection 20 Units by SubCUTAneous route once. No current facility-administered medications for this visit. No Known Allergies      Review of Systems:    Constitutional: negative  Ears, Nose, Mouth, Throat, and Face: negative  Respiratory: negative  Cardiovascular: negative  Gastrointestinal: negative  Genitourinary:negative  Integument/Breast: negative  Hematologic/Lymphatic: negative  Musculoskeletal:negative  Neurological: negative  Behavioral/Psychiatric: negative  Endocrine: negative  Allergic/Immunologic: negative    Objective:     Visit Vitals  /87 (BP 1 Location: Left arm, BP Patient Position: Sitting)   Pulse 74   Temp 98.3 °F (36.8 °C) (Oral)   Resp 18   Ht 5' 8.5\" (1.74 m)   Wt 263 lb (119.3 kg)   SpO2 98%   BMI 39.41 kg/m²        Physical Exam:    General:  alert, no distress, morbidly obese   Eyes:  conjunctivae and sclerae normal, pupils equal, round, reactive to light, extraocular movements intact without nystagmus   Throat & Neck: no erythema or exudates noted and neck supple and symmetrical; no palpable masses   Lungs:   clear to auscultation bilaterally   Heart:  Regular rate and rhythm   Abdomen:   obese, soft, nontender, nondistended, no masses or organomegaly,    Extremities: no edema,  no gait disturbances   Skin: Normal.       Assessment:     1. Morbid obesity (Body mass index is 39.41 kg/m².) with multiple comorbidities. The patient meets criteria established by the NIH for weight loss surgery candidates. Without weight reduction, co-morbidities will escalate as well as increase risk of early mortality. Our recommendation is the patient could be served with laparoscopic gastric bypass surgery.  I explained to the patient differences between laparoscopic gastric bypass, laparoscopic adjustable gastric banding, and laparoscopic vertical sleeve gastrectomy with respect to expected weight loss, resolution of comorbidities and risks. Mr. Chaparro Dumont has attended one our informational meetings and has seen our educational materials. He has requested Dr. Stephen Bhardwaj to perform his procedure. I reviewed the role for this procedure as a tool to help him achieve his weight loss goals. I reminded him that effective weight loss comes from lifelong adherence to changes in dietary choices, eating habits and exercise. Recommendation: We will request approval for laparoscopic gastric bypass surgery. He is a good candidate for gastric bypass. Signed By: Trisha Maier MD     May 29, 2019       Greater than half of the time: 40 minutes was used in counciling the patient about bariatric surgery and the steps she needs to take to move forward with her surgery. Mr. Chaparro Dumont has a reminder for a \"due or due soon\" health maintenance. I have asked that he contact his primary care provider for follow-up on this health maintenance.

## 2019-05-29 NOTE — PROGRESS NOTES
1. Have you been to the ER, urgent care clinic since your last visit? Hospitalized since your last visit? No    2. Have you seen or consulted any other health care providers outside of the [address] [place] [address] since your last visit? Include any pap smears or colon screening.  No

## 2019-06-06 DIAGNOSIS — Z01.818 PRE-OP EXAM: Primary | ICD-10-CM

## 2019-07-08 ENCOUNTER — HOSPITAL ENCOUNTER (OUTPATIENT)
Dept: ULTRASOUND IMAGING | Age: 59
Discharge: HOME | End: 2019-07-08
Attending: NURSE PRACTITIONER
Payer: OTHER GOVERNMENT

## 2019-07-08 ENCOUNTER — HOSPITAL ENCOUNTER (OUTPATIENT)
Dept: PREADMISSION TESTING | Age: 59
Discharge: HOME | End: 2019-07-08
Attending: NURSE PRACTITIONER
Payer: OTHER GOVERNMENT

## 2019-07-08 ENCOUNTER — HOSPITAL ENCOUNTER (OUTPATIENT)
Dept: GENERAL RADIOLOGY | Age: 59
Discharge: HOME | End: 2019-07-08
Attending: NURSE PRACTITIONER
Payer: OTHER GOVERNMENT

## 2019-07-08 ENCOUNTER — OFFICE VISIT (OUTPATIENT)
Dept: SURGERY | Age: 59
End: 2019-07-08

## 2019-07-08 VITALS
WEIGHT: 255 LBS | RESPIRATION RATE: 18 BRPM | HEART RATE: 58 BPM | DIASTOLIC BLOOD PRESSURE: 67 MMHG | BODY MASS INDEX: 37.77 KG/M2 | TEMPERATURE: 98 F | OXYGEN SATURATION: 98 % | SYSTOLIC BLOOD PRESSURE: 115 MMHG | HEIGHT: 69 IN

## 2019-07-08 VITALS
HEART RATE: 58 BPM | OXYGEN SATURATION: 99 % | DIASTOLIC BLOOD PRESSURE: 67 MMHG | BODY MASS INDEX: 37.81 KG/M2 | WEIGHT: 255.25 LBS | SYSTOLIC BLOOD PRESSURE: 115 MMHG | HEIGHT: 69 IN | TEMPERATURE: 98 F

## 2019-07-08 DIAGNOSIS — Z01.818 PRE-OP EXAM: ICD-10-CM

## 2019-07-08 DIAGNOSIS — N28.9 RENAL INSUFFICIENCY: ICD-10-CM

## 2019-07-08 DIAGNOSIS — R79.89 ABNORMAL THYROID STIMULATING HORMONE (TSH) LEVEL: ICD-10-CM

## 2019-07-08 DIAGNOSIS — E11.69 DIABETES MELLITUS TYPE 2 IN OBESE (HCC): ICD-10-CM

## 2019-07-08 DIAGNOSIS — E78.2 MIXED HYPERLIPIDEMIA: ICD-10-CM

## 2019-07-08 DIAGNOSIS — E66.9 OBESITY, CLASS II, BMI 35-39.9: Primary | ICD-10-CM

## 2019-07-08 DIAGNOSIS — E66.9 DIABETES MELLITUS TYPE 2 IN OBESE (HCC): ICD-10-CM

## 2019-07-08 PROBLEM — E66.01 SEVERE OBESITY (HCC): Status: ACTIVE | Noted: 2019-07-08

## 2019-07-08 LAB
25(OH)D3 SERPL-MCNC: 46.2 NG/ML (ref 30–100)
ALBUMIN SERPL-MCNC: 3.9 G/DL (ref 3.5–5)
ALBUMIN/GLOB SERPL: 1.2 {RATIO} (ref 1.1–2.2)
ALP SERPL-CCNC: 79 U/L (ref 45–117)
ALT SERPL-CCNC: 38 U/L (ref 12–78)
ANION GAP SERPL CALC-SCNC: 4 MMOL/L (ref 5–15)
APPEARANCE UR: CLEAR
AST SERPL-CCNC: 29 U/L (ref 15–37)
ATRIAL RATE: 53 BPM
BACTERIA URNS QL MICRO: NEGATIVE /HPF
BASOPHILS # BLD: 0.1 K/UL (ref 0–0.1)
BASOPHILS NFR BLD: 1 % (ref 0–1)
BILIRUB SERPL-MCNC: 1.1 MG/DL (ref 0.2–1)
BILIRUB UR QL: NEGATIVE
BUN SERPL-MCNC: 20 MG/DL (ref 6–20)
BUN/CREAT SERPL: 13 (ref 12–20)
CALCIUM SERPL-MCNC: 9.2 MG/DL (ref 8.5–10.1)
CALCULATED P AXIS, ECG09: 48 DEGREES
CALCULATED R AXIS, ECG10: -2 DEGREES
CALCULATED T AXIS, ECG11: -6 DEGREES
CHLORIDE SERPL-SCNC: 107 MMOL/L (ref 97–108)
CO2 SERPL-SCNC: 31 MMOL/L (ref 21–32)
COLOR UR: NORMAL
CREAT SERPL-MCNC: 1.52 MG/DL (ref 0.7–1.3)
DIAGNOSIS, 93000: NORMAL
DIFFERENTIAL METHOD BLD: ABNORMAL
EOSINOPHIL # BLD: 0.2 K/UL (ref 0–0.4)
EOSINOPHIL NFR BLD: 5 % (ref 0–7)
EPITH CASTS URNS QL MICRO: NORMAL /LPF
ERYTHROCYTE [DISTWIDTH] IN BLOOD BY AUTOMATED COUNT: 15.4 % (ref 11.5–14.5)
EST. AVERAGE GLUCOSE BLD GHB EST-MCNC: 163 MG/DL
GLOBULIN SER CALC-MCNC: 3.3 G/DL (ref 2–4)
GLUCOSE SERPL-MCNC: 89 MG/DL (ref 65–100)
GLUCOSE UR STRIP.AUTO-MCNC: NEGATIVE MG/DL
HBA1C MFR BLD: 7.3 % (ref 4.2–6.3)
HCT VFR BLD AUTO: 45.4 % (ref 36.6–50.3)
HGB BLD-MCNC: 13 G/DL (ref 12.1–17)
HGB UR QL STRIP: NEGATIVE
HYALINE CASTS URNS QL MICRO: NORMAL /LPF (ref 0–5)
IMM GRANULOCYTES # BLD AUTO: 0 K/UL (ref 0–0.04)
IMM GRANULOCYTES NFR BLD AUTO: 0 % (ref 0–0.5)
IRON SERPL-MCNC: 75 UG/DL (ref 35–150)
KETONES UR QL STRIP.AUTO: NEGATIVE MG/DL
LEUKOCYTE ESTERASE UR QL STRIP.AUTO: NEGATIVE
LYMPHOCYTES # BLD: 2.2 K/UL (ref 0.8–3.5)
LYMPHOCYTES NFR BLD: 44 % (ref 12–49)
MCH RBC QN AUTO: 22.2 PG (ref 26–34)
MCHC RBC AUTO-ENTMCNC: 28.6 G/DL (ref 30–36.5)
MCV RBC AUTO: 77.5 FL (ref 80–99)
MONOCYTES # BLD: 0.3 K/UL (ref 0–1)
MONOCYTES NFR BLD: 6 % (ref 5–13)
NEUTS SEG # BLD: 2.1 K/UL (ref 1.8–8)
NEUTS SEG NFR BLD: 44 % (ref 32–75)
NITRITE UR QL STRIP.AUTO: NEGATIVE
NRBC # BLD: 0 K/UL (ref 0–0.01)
NRBC BLD-RTO: 0 PER 100 WBC
P-R INTERVAL, ECG05: 142 MS
PH UR STRIP: 6 [PH] (ref 5–8)
PLATELET # BLD AUTO: 252 K/UL (ref 150–400)
PMV BLD AUTO: 11.4 FL (ref 8.9–12.9)
POTASSIUM SERPL-SCNC: 4.1 MMOL/L (ref 3.5–5.1)
PROT SERPL-MCNC: 7.2 G/DL (ref 6.4–8.2)
PROT UR STRIP-MCNC: NEGATIVE MG/DL
Q-T INTERVAL, ECG07: 430 MS
QRS DURATION, ECG06: 84 MS
QTC CALCULATION (BEZET), ECG08: 403 MS
RBC # BLD AUTO: 5.86 M/UL (ref 4.1–5.7)
RBC #/AREA URNS HPF: NORMAL /HPF (ref 0–5)
RBC MORPH BLD: ABNORMAL
RBC MORPH BLD: ABNORMAL
SODIUM SERPL-SCNC: 142 MMOL/L (ref 136–145)
SP GR UR REFRACTOMETRY: 1.03 (ref 1–1.03)
TSH SERPL DL<=0.05 MIU/L-ACNC: 0.22 UIU/ML (ref 0.36–3.74)
UA: UC IF INDICATED,UAUC: NORMAL
UROBILINOGEN UR QL STRIP.AUTO: 0.2 EU/DL (ref 0.2–1)
VENTRICULAR RATE, ECG03: 53 BPM
WBC # BLD AUTO: 4.9 K/UL (ref 4.1–11.1)
WBC URNS QL MICRO: NORMAL /HPF (ref 0–4)

## 2019-07-08 PROCEDURE — 80053 COMPREHEN METABOLIC PANEL: CPT

## 2019-07-08 PROCEDURE — 93005 ELECTROCARDIOGRAM TRACING: CPT

## 2019-07-08 PROCEDURE — 76700 US EXAM ABDOM COMPLETE: CPT

## 2019-07-08 PROCEDURE — 36415 COLL VENOUS BLD VENIPUNCTURE: CPT

## 2019-07-08 PROCEDURE — 86677 HELICOBACTER PYLORI ANTIBODY: CPT

## 2019-07-08 PROCEDURE — 83036 HEMOGLOBIN GLYCOSYLATED A1C: CPT

## 2019-07-08 PROCEDURE — 84443 ASSAY THYROID STIM HORMONE: CPT

## 2019-07-08 PROCEDURE — 85025 COMPLETE CBC W/AUTO DIFF WBC: CPT

## 2019-07-08 PROCEDURE — 71046 X-RAY EXAM CHEST 2 VIEWS: CPT

## 2019-07-08 PROCEDURE — 83540 ASSAY OF IRON: CPT

## 2019-07-08 PROCEDURE — 82306 VITAMIN D 25 HYDROXY: CPT

## 2019-07-08 PROCEDURE — 81001 URINALYSIS AUTO W/SCOPE: CPT

## 2019-07-08 RX ORDER — POLYETHYLENE GLYCOL 3350 17 G/17G
17 POWDER, FOR SOLUTION ORAL DAILY
Qty: 1530 G | Refills: 2 | Status: SHIPPED | OUTPATIENT
Start: 2019-07-08

## 2019-07-08 RX ORDER — ONDANSETRON 4 MG/1
4 TABLET, ORALLY DISINTEGRATING ORAL
Qty: 20 TAB | Refills: 0 | Status: SHIPPED | OUTPATIENT
Start: 2019-07-08 | End: 2020-01-17

## 2019-07-08 RX ORDER — FAMOTIDINE 40 MG/1
40 TABLET, FILM COATED ORAL DAILY
Qty: 30 TAB | Refills: 0 | Status: SHIPPED | OUTPATIENT
Start: 2019-07-08 | End: 2020-04-17

## 2019-07-08 RX ORDER — ACETAMINOPHEN 500 MG
1000 TABLET ORAL
COMMUNITY

## 2019-07-08 RX ORDER — GLUCOSAMINE SULFATE 1500 MG
2000 POWDER IN PACKET (EA) ORAL DAILY
COMMUNITY
End: 2020-04-17

## 2019-07-08 NOTE — PERIOP NOTES
PAT instructions reviewed with patient and family; and given the opportunity to ask questions. Patient given surgical site information FAQs handout and reviewed. Patient given two bottles CHG soap and instruction sheet, instructions for use reviewed with patient. Patient advised to notify Dr. Swapna Barron office re: currently has toothache and scheduled to see dentist on 7-10-19.

## 2019-07-08 NOTE — PATIENT INSTRUCTIONS
Day Before Surgery:   -  take (2) Extra Strength Tylenol (acetaminophen) at noon and 8 pm    -  you may drink sugar free clear liquids until 3 hours prior to surgery      your after surgery prescriptions BEFORE surgery     Make your 2, 4 and 6 week appointments for after surgery     Sign up for My Chart     Stop the METFORMIN 3 days prior to surgery           Learning About How to Prepare for Weight-Loss Surgery  How can you prepare for weight-loss surgery? Having weight-loss surgery (also called bariatric surgery) is a big step. You can prepare for surgery by having a plan. Your plan may include your goals for losing weight and how to makes changes in your diet, activity, and lifestyle to help raise your chances of success. One way to prepare for surgery is to think about your goal or reason why you want to reach a healthy weight. Do you want to lower your blood pressure, cholesterol, or blood sugar? Do you want to be able to sleep better, play with your kids, or walk around the block? Having a reason can help you stay with your plan and meet your goals. Your weight-loss surgery team can help you meet your goals and get ready for surgery. Sylvia Reed work with a team that's trained to help you lose weight and make healthy changes in your life. This team may include:  · A medical doctor or nurse to help manage your care and schedule tests before surgery. · A surgeon who specializes in weight-loss surgery. · A registered dietitian to help you plan meals and make changes in the way you eat. · An exercise specialist to help you be more active and get stronger. · A therapist or counselor to help you learn why you eat and teach you ways to deal with stress and your emotions. Your team will also be there to help you prepare for life after surgery. They will help you adjust to new ways of eating and changes to your body. How will weight-loss surgery affect your life?   You have likely thought a lot about how surgery may affect your life--how you will eat, how your body will look, or how you will feel. Some people feel overwhelmed with these changes. But planning can help you prepare for the changes and meet your weight-loss goals. One important step in your plan is to learn about the ways surgery will affect your life. These may include:  · A slimmer you. You probably will lose weight very quickly in the first few months after surgery. As time goes on, your weight loss will slow down. How much weight you lose depends on what type of surgery you had and how well your new eating and activity plans are working for you. · A new way of eating. Success in reaching and keeping a healthy weight depends on making lifelong changes in how you eat. After surgery, you raise your chances of success if you:  ? Eat just a few ounces of food at a time. ? Eat very slowly and chew your food to mush. ? Don't drink for 30 minutes before you eat, during your meal, and for 30 minutes after you eat. ? Are careful about drinking alcohol. ? Avoid foods that are high in fat or sugar. ? Take vitamin and mineral supplements. · A healthier you. Weight-loss surgery can have some real health benefits. Problems like diabetes, high blood pressure, and sleep apnea may go away--or at least become easier to manage. · A more active you. After surgery, being active on most days of the week will help you reach your weight goal and avoid gaining back the weight you lose. · A lot of extra skin. When you lose weight quickly, you may have a lot of extra skin. That's normal. You can have surgery to remove the extra skin if it bothers you. There are going to be some ups and downs while you get used to these changes. So another way to adjust is to identify who can help support you. Getting support from friends and family can help.  And joining a support group for people who have had the surgery can be a big help too, because they know what you're going through. As you know, it's a big decision to have weight-loss surgery. But when you have a plan, you can focus on losing weight and living a healthier life. So what steps can you take to prepare for weight-loss surgery? Will you set some goals? Will you learn about how surgery can affect your life? How about asking family or friends for help? Write out your plan. Then get ready. Where can you learn more? Go to http://alirio-cuca.info/. Enter T057 in the search box to learn more about \"Learning About How to Prepare for Weight-Loss Surgery. \"  Current as of: June 25, 2018  Content Version: 11.9  © 6817-1827 [employer]. Care instructions adapted under license by [employer] (which disclaims liability or warranty for this information). If you have questions about a medical condition or this instruction, always ask your healthcare professional. [address] any warranty or liability for your use of this information.

## 2019-07-08 NOTE — PROGRESS NOTES
HISTORY OF PRESENT ILLNESS  Jelani Soto is a 64 y.o. male. HPI  Chief Complaint   Patient presents with    Surg H&P     Schedule for laparoscopic gastric bypass on 07/25/2019 by Elana Day. Patient Active Problem List   Diagnosis Code    DM (diabetes mellitus) ([facility] E11.9    Hyperlipidemia E78.5    Renal insufficiency N28.9    Dyslipidemia E78.5    Hypertension I10    Class 2 obesity due to excess calories with serious comorbidity in adult [id number]    Obesity, Class II, BMI 35-39.9 E66.9    Severe obesity (HCC) E66.01     Past Medical History:   Diagnosis Date    Arthritis     Class 2 obesity due to excess calories with serious comorbidity in adult 8/21/2018    DM (diabetes mellitus) ([facility]     IDDM    Dyslipidemia     Hyperlipidemia     Hypertension     Ill-defined condition     TOOTHACHE LEFT LOWER (7-8-19) PT. HAS APPT.  7-10-19    Obesity, Class II, BMI 35-39.9 8/21/2018    Renal insufficiency      Past Surgical History:   Procedure Laterality Date    CARDIAC CATHETERIZATION      CARDIAC SURG PROCEDURE UNLIST  2011    CARDIAC CATH    HX ORTHOPAEDIC      LEFT 4TH FINGER PROCEDURE     Social History     Socioeconomic History    Marital status:      Spouse name: Not on file    Number of children: Not on file    Years of education: Not on file    Highest education level: Not on file   Occupational History    Occupation: Manager      Comment: [employer]   Social Needs    Financial resource strain: Not on file    Food insecurity:     Worry: Not on file     Inability: Not on file    Transportation needs:     Medical: Not on file     Non-medical: Not on file   Tobacco Use    Smoking status: Never Smoker    Smokeless tobacco: Never Used   Substance and Sexual Activity    Alcohol use: Yes     Frequency: Monthly or less     Binge frequency: Never     Comment: 2 DRINKS PER MONTH    Drug use: No    Sexual activity: Not on file   Lifestyle    Physical activity:     Days per week: Not on file Minutes per session: Not on file    Stress: Not on file   Relationships    Social connections:     Talks on phone: Not on file     Gets together: Not on file     Attends Hinduism service: Not on file     Active member of club or organization: Not on file     Attends meetings of clubs or organizations: Not on file     Relationship status: Not on file    Intimate partner violence:     Fear of current or ex partner: Not on file     Emotionally abused: Not on file     Physically abused: Not on file     Forced sexual activity: Not on file   Other Topics Concern    Not on file   Social History Narrative    In the home with spouse, adult daughter and 2 twin grandchildren      Family History   Problem Relation Age of Onset    Diabetes Mother     Stroke Mother         TIA    Cancer Mother         BREAST    Seizures Mother     Diabetes Brother     Heart Attack Brother     Anesth Problems Neg Hx        Current Outpatient Medications:     cholecalciferol (VITAMIN D3) 1,000 unit cap, Take 2,000 Units by mouth daily. , Disp: , Rfl:     acetaminophen (TYLENOL EXTRA STRENGTH) 500 mg tablet, Take 1,000 mg by mouth every six (6) hours as needed for Pain., Disp: , Rfl:     famotidine (PEPCID) 40 mg tablet, Take 1 Tab by mouth daily. AFTER SURGERY, Disp: 30 Tab, Rfl: 0    ondansetron (ZOFRAN ODT) 4 mg disintegrating tablet, Take 1 Tab by mouth every eight (8) hours as needed for Nausea. AFTER SURGERY, Disp: 20 Tab, Rfl: 0    polyethylene glycol (MIRALAX) 17 gram/dose powder, Take 17 g by mouth daily. Indications: constipation, Disp: 1530 g, Rfl: 2    hydroCHLOROthiazide (HYDRODIURIL) 12.5 mg tablet, Take 12.5 mg by mouth daily. , Disp: , Rfl:     Liraglutide (VICTOZA) 0.6 mg/0.1 mL (18 mg/3 mL) pnij, 1.8 mg by SubCUTAneous route Daily (before breakfast). , Disp: , Rfl:     metFORMIN (GLUCOPHAGE) 1,000 mg tablet, Take 1,000 mg by mouth two (2) times daily (with meals). , Disp: , Rfl:     simvastatin (ZOCOR) 40 mg tablet, Take  by mouth nightly., Disp: , Rfl:     insulin glargine (LANTUS) 100 unit/mL injection, 100 Units by SubCUTAneous route Daily (before breakfast). , Disp: , Rfl:     clindamycin (CLEOCIN) 150 mg capsule, Take 150 mg by mouth., Disp: , Rfl:   Allergies   Allergen Reactions    Pork Derived (Porcine) Rash     RASH, SWELLING     PCP Jennifer Lo MD      Review of Systems   Constitutional: Negative for chills, fever and malaise/fatigue. HENT: Negative for hearing loss and tinnitus. Eyes: Positive for blurred vision (glasses). Respiratory: Negative for cough, shortness of breath and wheezing.         - NANCY      Cardiovascular: Negative for chest pain. - cardiac cath   HR \"always slow\"   Gastrointestinal: Negative for abdominal pain, constipation, diarrhea, heartburn, nausea and vomiting. Genitourinary: Positive for frequency. Negative for dysuria, hematuria and urgency. No hx kidney stones      Musculoskeletal: Positive for joint pain (hips and knees with steps ). Negative for back pain, falls, myalgias and neck pain. Skin: Negative. Endo/Heme/Allergies:        Last A1C 7.6%        Physical Exam   Constitutional: He is oriented to person, place, and time. No distress. /67   Pulse (!) 58   Temp 98 °F (36.7 °C) (Oral)   Resp 18   Ht 5' 9\" (1.753 m)   Wt 255 lb (115.7 kg)   SpO2 98%   BMI 37.66 kg/m²   AA male here with spouse    HENT:   Head: Normocephalic. Mouth/Throat: Oropharynx is clear and moist. No oropharyngeal exudate. Left lower gum with mild erythema and edema at tooth extraction site    Eyes: Pupils are equal, round, and reactive to light. EOM are normal. No scleral icterus. Neck: Normal range of motion. Neck supple. No JVD present. No tracheal deviation present. No thyromegaly present. Cardiovascular: Regular rhythm. Mild bradycardia    Pulmonary/Chest: Effort normal and breath sounds normal. No respiratory distress. He has no wheezes.  He has no rales. Abdominal: Bowel sounds are normal. There is no tenderness. There is no rebound and no guarding. Rounded and semi soft      Musculoskeletal: He exhibits no edema. Ambulating independently    Lymphadenopathy:     He has no cervical adenopathy. Neurological: He is alert and oriented to person, place, and time. Normal gait    Skin: Skin is warm and dry. He is not diaphoretic. ASSESSMENT and PLAN    ICD-10-CM ICD-9-CM    1. Obesity, Class II, BMI 35-39.9 E66.9 278.00    2. BMI 37.0-37.9, adult Z68.37 V85.37    3. Diabetes mellitus type 2 in obese (HCC) E11.69 250.00     E66.9 278.00    4. Renal insufficiency N28.9 593.9    5. Mixed hyperlipidemia E78.2 272.2      1. Morbid obesity:  Scheduled for Malabsorptive gastric bypass for treatment of morbid obesity  on 7/25/19 with Dr. Simeon Sandifer protocol reviewed:  Acetaminophen 1000 mg at noon and 8 pm day before surgery and may have clear liquids (no caffeine, no ETOH, no carbonation and calorie free) until 3 hours prior to surgery   Preadmission testing results that were available reviewed face to face with patient TSH and D were pending   Following recommendations made based on results:  TSH was low and no baseline available   Will check Free T4 and follow up with patient   Post operative prescriptions sent to pharmacy on file for AFTER surgery:  Pepcid 40 mg every day x 30 days then reassess need; Zofran 4 mg ODT #20 no refills for post op nausea; Miralax every day    Post op pain management:  acetaminophen 1000 mg po q 8 hours prn; abdominal support / splinting; heating pad prn   Started on liver shrinking diet and Bariatric vitamins   Reviewed post operative diet, restrictions, follow up and medications  Post operative 2, 4 and 6  week appointments made  Reviewed educational materials and book    2. HTN continue HCTZ for now and monitor during the perioperative and post operative period   3.   DM Type 2 in Obese hold metformin 3 days prior to surgery, continue lantus and victoza; work with endocrine to adjust insulin as needed (he is comfortable adjusting lantus)   4. Renal insufficiency - he is uncertain his baseline renal function, monitor closely during perioperative period   5. Hyperlipidemia continue zocor post op       Mily Mcintyre verbalized understanding and questions were answered to the best of my knowledge and ability. Bariatric surgery  educational materials were provided.     33 minutes spent in face to face with patient

## 2019-07-08 NOTE — Clinical Note
His TSH (thyroid hormine)  was off on his pre op labs. I put in an order for TSH and T4 if he can go to [facility] to get checked asap.   Thanks - SOHAIL

## 2019-07-08 NOTE — PROGRESS NOTES
1. Have you been to the ER, urgent care clinic since your last visit? Hospitalized since your last visit? No    2. Have you seen or consulted any other health care providers outside of the [address] [place] [address] since your last visit? Include any pap smears or colon screening.  no

## 2019-07-09 LAB — H PYLORI IGG SER IA-ACNC: <0.8 INDEX VALUE (ref 0–0.79)

## 2019-07-09 NOTE — PERIOP NOTES
Connect care message sent to ZACHARY Vargas LPN/Dr. Herbert's office re: abnormal lab results creatinine 1.52, TSH 0.22, RBC 5.86, HgbA1c 7.3.

## 2019-07-12 ENCOUNTER — OFFICE VISIT (OUTPATIENT)
Dept: SURGERY | Age: 59
End: 2019-07-12

## 2019-07-12 VITALS
RESPIRATION RATE: 16 BRPM | WEIGHT: 266 LBS | SYSTOLIC BLOOD PRESSURE: 129 MMHG | DIASTOLIC BLOOD PRESSURE: 78 MMHG | TEMPERATURE: 98.6 F | HEART RATE: 64 BPM | HEIGHT: 69 IN | BODY MASS INDEX: 39.4 KG/M2 | OXYGEN SATURATION: 96 %

## 2019-07-12 DIAGNOSIS — E66.01 SEVERE OBESITY (BMI 35.0-35.9 WITH COMORBIDITY) (HCC): Primary | ICD-10-CM

## 2019-07-12 RX ORDER — CLINDAMYCIN HYDROCHLORIDE 150 MG/1
150 CAPSULE ORAL
COMMUNITY
Start: 2019-07-10 | End: 2020-01-17

## 2019-07-12 NOTE — PROGRESS NOTES
1. Have you been to the ER, urgent care clinic since your last visit? Hospitalized since your last visit? No    2. Have you seen or consulted any other health care providers outside of the [address] [place] [address] since your last visit? Include any pap smears or colon screening.  No

## 2019-07-12 NOTE — PROGRESS NOTES
[facility] General Surgery History and Physical    History of Present Illness:      Ivory Parish is a 64 y.o. male who has been approved for gastric bypass. He has been doing well and no current issues. Past Medical History:   Diagnosis Date    Arthritis     Class 2 obesity due to excess calories with serious comorbidity in adult 8/21/2018    DM (diabetes mellitus) ([facility] [address]     IDDM    Dyslipidemia     Hyperlipidemia     Hypertension     Ill-defined condition     TOOTHACHE LEFT LOWER (7-8-19) PT. HAS APPT. 7-10-19    Obesity, Class II, BMI 35-39.9 8/21/2018    Renal insufficiency        Past Surgical History:   Procedure Laterality Date    CARDIAC CATHETERIZATION      CARDIAC SURG PROCEDURE UNLIST  2011    CARDIAC CATH    HX ORTHOPAEDIC      LEFT 4TH FINGER PROCEDURE         Current Outpatient Medications:     clindamycin (CLEOCIN) 150 mg capsule, Take 150 mg by mouth., Disp: , Rfl:     cholecalciferol (VITAMIN D3) 1,000 unit cap, Take 2,000 Units by mouth daily. , Disp: , Rfl:     acetaminophen (TYLENOL EXTRA STRENGTH) 500 mg tablet, Take 1,000 mg by mouth every six (6) hours as needed for Pain., Disp: , Rfl:     hydroCHLOROthiazide (HYDRODIURIL) 12.5 mg tablet, Take 12.5 mg by mouth daily. , Disp: , Rfl:     Liraglutide (VICTOZA) 0.6 mg/0.1 mL (18 mg/3 mL) pnij, 1.8 mg by SubCUTAneous route Daily (before breakfast). , Disp: , Rfl:     metFORMIN (GLUCOPHAGE) 1,000 mg tablet, Take 1,000 mg by mouth two (2) times daily (with meals). , Disp: , Rfl:     simvastatin (ZOCOR) 40 mg tablet, Take  by mouth nightly., Disp: , Rfl:     insulin glargine (LANTUS) 100 unit/mL injection, 100 Units by SubCUTAneous route Daily (before breakfast). , Disp: , Rfl:     famotidine (PEPCID) 40 mg tablet, Take 1 Tab by mouth daily. AFTER SURGERY, Disp: 30 Tab, Rfl: 0    ondansetron (ZOFRAN ODT) 4 mg disintegrating tablet, Take 1 Tab by mouth every eight (8) hours as needed for Nausea.  AFTER SURGERY, Disp: 20 Tab, Rfl: 0    polyethylene glycol (MIRALAX) 17 gram/dose powder, Take 17 g by mouth daily.  Indications: constipation, Disp: 1530 g, Rfl: 2    Allergies   Allergen Reactions    Pork Derived (Porcine) Rash     RASH, SWELLING       Social History     Socioeconomic History    Marital status:      Spouse name: Not on file    Number of children: Not on file    Years of education: Not on file    Highest education level: Not on file   Occupational History    Occupation: Manager      Comment: [employer]   Social Needs    Financial resource strain: Not on file    Food insecurity:     Worry: Not on file     Inability: Not on file    Transportation needs:     Medical: Not on file     Non-medical: Not on file   Tobacco Use    Smoking status: Never Smoker    Smokeless tobacco: Never Used   Substance and Sexual Activity    Alcohol use: Yes     Frequency: Monthly or less     Binge frequency: Never     Comment: 2 DRINKS PER MONTH    Drug use: No    Sexual activity: Not on file   Lifestyle    Physical activity:     Days per week: Not on file     Minutes per session: Not on file    Stress: Not on file   Relationships    Social connections:     Talks on phone: Not on file     Gets together: Not on file     Attends Nondenominational service: Not on file     Active member of club or organization: Not on file     Attends meetings of clubs or organizations: Not on file     Relationship status: Not on file    Intimate partner violence:     Fear of current or ex partner: Not on file     Emotionally abused: Not on file     Physically abused: Not on file     Forced sexual activity: Not on file   Other Topics Concern    Not on file   Social History Narrative    In the home with spouse, adult daughter and 2 twin grandchildren        Family History   Problem Relation Age of Onset    Diabetes Mother     Stroke Mother         TIA    Cancer Mother         BREAST    Seizures Mother     Diabetes Brother     Heart Attack Brother    Dorothy Problems Neg Hx        ROS   Constitutional: negative  Ears, Nose, Mouth, Throat, and Face: negative  Respiratory: negative  Cardiovascular: negative  Gastrointestinal: negative  Genitourinary:negative  Integument/Breast: negative  Hematologic/Lymphatic: negative  Behavioral/Psychiatric: negative  Allergic/Immunologic: negative      Physical Exam:     Visit Vitals  /78 (BP 1 Location: Left arm, BP Patient Position: Sitting)   Pulse 64   Temp 98.6 °F (37 °C) (Oral)   Resp 16   Ht 5' 9\" (1.753 m)   Wt 266 lb (120.7 kg)   SpO2 96%   BMI 39.28 kg/m²       General - alert and oriented, no apparent distress  HEENT - no jaundice, no hearing imparement  Pulm - CTAB, no C/W/R  CV - RRR, no M/R/G  Abd - soft, ND, BS Present, NTTP  Ext - pulses intact in UE and LE bilaterally, no edema  Skin - supple, no rashes  Psychiatric - normal affect, good mood    Labs  Lab Results   Component Value Date/Time    Sodium 142 07/08/2019 11:16 AM    Potassium 4.1 07/08/2019 11:16 AM    Chloride 107 07/08/2019 11:16 AM    CO2 31 07/08/2019 11:16 AM    Anion gap 4 (L) 07/08/2019 11:16 AM    Glucose 89 07/08/2019 11:16 AM    BUN 20 07/08/2019 11:16 AM    Creatinine 1.52 (H) 07/08/2019 11:16 AM    BUN/Creatinine ratio 13 07/08/2019 11:16 AM    GFR est AA 58 (L) 07/08/2019 11:16 AM    GFR est non-AA 48 (L) 07/08/2019 11:16 AM    Calcium 9.2 07/08/2019 11:16 AM    Bilirubin, total 1.1 (H) 07/08/2019 11:16 AM    AST (SGOT) 29 07/08/2019 11:16 AM    Alk.  phosphatase 79 07/08/2019 11:16 AM    Protein, total 7.2 07/08/2019 11:16 AM    Albumin 3.9 07/08/2019 11:16 AM    Globulin 3.3 07/08/2019 11:16 AM    A-G Ratio 1.2 07/08/2019 11:16 AM    ALT (SGPT) 38 07/08/2019 11:16 AM     Lab Results   Component Value Date/Time    WBC 4.9 07/08/2019 11:16 AM    HGB 13.0 07/08/2019 11:16 AM    HCT 45.4 07/08/2019 11:16 AM    PLATELET 532 07/03/4029 11:16 AM    MCV 77.5 (L) 07/08/2019 11:16 AM         Imaging  CXR - normal  I have reviewed and agree with all of the pertinent images    Assessment:     Sandrine Dotson is a 64 y.o. male with severe obesity    Recommendations:     1. He is ready for laparoscopic gastric bypass. I have discussed the above procedure with the patient in detail. We reviewed the benefits and possible complications of the surgery which include bleeding, infection, damage to adjacent organs, venous thromboembolism, need for repeat surgery, death and other unforseen complications. The patient agreed to proceed with the surgery. Emy Leyva MD    . Saqib Duenas has a reminder for a \"due or due soon\" health maintenance. I have asked that he contact his primary care provider for follow-up on this health maintenance.

## 2019-07-12 NOTE — H&P (VIEW-ONLY)
[facility] General Surgery History and Physical    History of Present Illness:      Karen Barba is a 64 y.o. male who has been approved for gastric bypass. He has been doing well and no current issues. Past Medical History:   Diagnosis Date    Arthritis     Class 2 obesity due to excess calories with serious comorbidity in adult 8/21/2018    DM (diabetes mellitus) ([facility] [address]     IDDM    Dyslipidemia     Hyperlipidemia     Hypertension     Ill-defined condition     TOOTHACHE LEFT LOWER (7-8-19) PT. HAS APPT. 7-10-19    Obesity, Class II, BMI 35-39.9 8/21/2018    Renal insufficiency        Past Surgical History:   Procedure Laterality Date    CARDIAC CATHETERIZATION      CARDIAC SURG PROCEDURE UNLIST  2011    CARDIAC CATH    HX ORTHOPAEDIC      LEFT 4TH FINGER PROCEDURE         Current Outpatient Medications:     clindamycin (CLEOCIN) 150 mg capsule, Take 150 mg by mouth., Disp: , Rfl:     cholecalciferol (VITAMIN D3) 1,000 unit cap, Take 2,000 Units by mouth daily. , Disp: , Rfl:     acetaminophen (TYLENOL EXTRA STRENGTH) 500 mg tablet, Take 1,000 mg by mouth every six (6) hours as needed for Pain., Disp: , Rfl:     hydroCHLOROthiazide (HYDRODIURIL) 12.5 mg tablet, Take 12.5 mg by mouth daily. , Disp: , Rfl:     Liraglutide (VICTOZA) 0.6 mg/0.1 mL (18 mg/3 mL) pnij, 1.8 mg by SubCUTAneous route Daily (before breakfast). , Disp: , Rfl:     metFORMIN (GLUCOPHAGE) 1,000 mg tablet, Take 1,000 mg by mouth two (2) times daily (with meals). , Disp: , Rfl:     simvastatin (ZOCOR) 40 mg tablet, Take  by mouth nightly., Disp: , Rfl:     insulin glargine (LANTUS) 100 unit/mL injection, 100 Units by SubCUTAneous route Daily (before breakfast). , Disp: , Rfl:     famotidine (PEPCID) 40 mg tablet, Take 1 Tab by mouth daily. AFTER SURGERY, Disp: 30 Tab, Rfl: 0    ondansetron (ZOFRAN ODT) 4 mg disintegrating tablet, Take 1 Tab by mouth every eight (8) hours as needed for Nausea.  AFTER SURGERY, Disp: 20 Tab, Rfl: 0    polyethylene glycol (MIRALAX) 17 gram/dose powder, Take 17 g by mouth daily.  Indications: constipation, Disp: 1530 g, Rfl: 2    Allergies   Allergen Reactions    Pork Derived (Porcine) Rash     RASH, SWELLING       Social History     Socioeconomic History    Marital status:      Spouse name: Not on file    Number of children: Not on file    Years of education: Not on file    Highest education level: Not on file   Occupational History    Occupation: Manager      Comment: [employer]   Social Needs    Financial resource strain: Not on file    Food insecurity:     Worry: Not on file     Inability: Not on file    Transportation needs:     Medical: Not on file     Non-medical: Not on file   Tobacco Use    Smoking status: Never Smoker    Smokeless tobacco: Never Used   Substance and Sexual Activity    Alcohol use: Yes     Frequency: Monthly or less     Binge frequency: Never     Comment: 2 DRINKS PER MONTH    Drug use: No    Sexual activity: Not on file   Lifestyle    Physical activity:     Days per week: Not on file     Minutes per session: Not on file    Stress: Not on file   Relationships    Social connections:     Talks on phone: Not on file     Gets together: Not on file     Attends Anabaptist service: Not on file     Active member of club or organization: Not on file     Attends meetings of clubs or organizations: Not on file     Relationship status: Not on file    Intimate partner violence:     Fear of current or ex partner: Not on file     Emotionally abused: Not on file     Physically abused: Not on file     Forced sexual activity: Not on file   Other Topics Concern    Not on file   Social History Narrative    In the home with spouse, adult daughter and 2 twin grandchildren        Family History   Problem Relation Age of Onset    Diabetes Mother     Stroke Mother         TIA    Cancer Mother         BREAST    Seizures Mother     Diabetes Brother     Heart Attack Brother    Dorothy Problems Neg Hx        ROS   Constitutional: negative  Ears, Nose, Mouth, Throat, and Face: negative  Respiratory: negative  Cardiovascular: negative  Gastrointestinal: negative  Genitourinary:negative  Integument/Breast: negative  Hematologic/Lymphatic: negative  Behavioral/Psychiatric: negative  Allergic/Immunologic: negative      Physical Exam:     Visit Vitals  /78 (BP 1 Location: Left arm, BP Patient Position: Sitting)   Pulse 64   Temp 98.6 °F (37 °C) (Oral)   Resp 16   Ht 5' 9\" (1.753 m)   Wt 266 lb (120.7 kg)   SpO2 96%   BMI 39.28 kg/m²       General - alert and oriented, no apparent distress  HEENT - no jaundice, no hearing imparement  Pulm - CTAB, no C/W/R  CV - RRR, no M/R/G  Abd - soft, ND, BS Present, NTTP  Ext - pulses intact in UE and LE bilaterally, no edema  Skin - supple, no rashes  Psychiatric - normal affect, good mood    Labs  Lab Results   Component Value Date/Time    Sodium 142 07/08/2019 11:16 AM    Potassium 4.1 07/08/2019 11:16 AM    Chloride 107 07/08/2019 11:16 AM    CO2 31 07/08/2019 11:16 AM    Anion gap 4 (L) 07/08/2019 11:16 AM    Glucose 89 07/08/2019 11:16 AM    BUN 20 07/08/2019 11:16 AM    Creatinine 1.52 (H) 07/08/2019 11:16 AM    BUN/Creatinine ratio 13 07/08/2019 11:16 AM    GFR est AA 58 (L) 07/08/2019 11:16 AM    GFR est non-AA 48 (L) 07/08/2019 11:16 AM    Calcium 9.2 07/08/2019 11:16 AM    Bilirubin, total 1.1 (H) 07/08/2019 11:16 AM    AST (SGOT) 29 07/08/2019 11:16 AM    Alk.  phosphatase 79 07/08/2019 11:16 AM    Protein, total 7.2 07/08/2019 11:16 AM    Albumin 3.9 07/08/2019 11:16 AM    Globulin 3.3 07/08/2019 11:16 AM    A-G Ratio 1.2 07/08/2019 11:16 AM    ALT (SGPT) 38 07/08/2019 11:16 AM     Lab Results   Component Value Date/Time    WBC 4.9 07/08/2019 11:16 AM    HGB 13.0 07/08/2019 11:16 AM    HCT 45.4 07/08/2019 11:16 AM    PLATELET 470 41/34/7890 11:16 AM    MCV 77.5 (L) 07/08/2019 11:16 AM         Imaging  CXR - normal  I have reviewed and agree with all of the pertinent images    Assessment:     Vlad Lopes is a 64 y.o. male with severe obesity    Recommendations:     1. He is ready for laparoscopic gastric bypass. I have discussed the above procedure with the patient in detail. We reviewed the benefits and possible complications of the surgery which include bleeding, infection, damage to adjacent organs, venous thromboembolism, need for repeat surgery, death and other unforseen complications. The patient agreed to proceed with the surgery. Rodriguez Almazan MD    Mr. Natasha Street has a reminder for a \"due or due soon\" health maintenance. I have asked that he contact his primary care provider for follow-up on this health maintenance.

## 2019-07-16 ENCOUNTER — TELEPHONE (OUTPATIENT)
Dept: SURGERY | Age: 59
End: 2019-07-16

## 2019-07-16 NOTE — TELEPHONE ENCOUNTER
ИРИНА Capone Alvita             His TSH (thyroid hormine)  was off on his pre op labs.  I put in an order for TSH and T4 if he can go to [facility] to get checked asap. Mohit Ramos -       Patient identified with two patient identifiers. Patient informed of TSH level per ИРИНА Doe and that she has placed order for TSH and T4 to have completed asap prior to surgery. Patient expressed understanding will go this evening to [facility] near his [place]. Order faxed to [phone].

## 2019-07-17 LAB
T4 FREE SERPL-MCNC: 1.15 NG/DL (ref 0.82–1.77)
TSH SERPL DL<=0.005 MIU/L-ACNC: 0.86 UIU/ML (ref 0.45–4.5)

## 2019-07-19 ENCOUNTER — TELEPHONE (OUTPATIENT)
Dept: SURGERY | Age: 59
End: 2019-07-19

## 2019-07-25 ENCOUNTER — ANESTHESIA EVENT (OUTPATIENT)
Dept: SURGERY | Age: 59
DRG: 621 | End: 2019-07-25
Payer: OTHER GOVERNMENT

## 2019-07-25 ENCOUNTER — ANESTHESIA (OUTPATIENT)
Dept: SURGERY | Age: 59
DRG: 621 | End: 2019-07-25
Payer: OTHER GOVERNMENT

## 2019-07-25 ENCOUNTER — HOSPITAL ENCOUNTER (INPATIENT)
Age: 59
LOS: 2 days | Discharge: HOME | DRG: 621 | End: 2019-07-27
Attending: SURGERY | Admitting: SURGERY
Payer: OTHER GOVERNMENT

## 2019-07-25 DIAGNOSIS — E66.01 SEVERE OBESITY (BMI 35.0-35.9 WITH COMORBIDITY) (HCC): Primary | ICD-10-CM

## 2019-07-25 LAB
GLUCOSE BLD STRIP.AUTO-MCNC: 116 MG/DL (ref 65–100)
GLUCOSE BLD STRIP.AUTO-MCNC: 160 MG/DL (ref 65–100)
GLUCOSE BLD STRIP.AUTO-MCNC: 165 MG/DL (ref 65–100)
GLUCOSE BLD STRIP.AUTO-MCNC: 192 MG/DL (ref 65–100)
SERVICE CMNT-IMP: ABNORMAL

## 2019-07-25 PROCEDURE — 0DJ08ZZ INSPECTION OF UPPER INTESTINAL TRACT, VIA NATURAL OR ARTIFICIAL OPENING ENDOSCOPIC: ICD-10-PCS | Performed by: SURGERY

## 2019-07-25 PROCEDURE — 76010000133 HC OR TIME 3 TO 3.5 HR: Performed by: SURGERY

## 2019-07-25 PROCEDURE — 77030020263 HC SOL INJ SOD CL0.9% LFCR 1000ML: Performed by: SURGERY

## 2019-07-25 PROCEDURE — 77030034701 HC DSCRTR CRDLS U/S DISP COVD -F: Performed by: SURGERY

## 2019-07-25 PROCEDURE — 74011250636 HC RX REV CODE- 250/636: Performed by: ANESTHESIOLOGY

## 2019-07-25 PROCEDURE — 77030002895 HC DEV VASC CLOSR COVD -B: Performed by: SURGERY

## 2019-07-25 PROCEDURE — 77030002933 HC SUT MCRYL J&J -A: Performed by: SURGERY

## 2019-07-25 PROCEDURE — 77030038157 HC DEV PWR CNTR DISP SIGNIA COVD -C: Performed by: SURGERY

## 2019-07-25 PROCEDURE — 88300 SURGICAL PATH GROSS: CPT

## 2019-07-25 PROCEDURE — 77030040361 HC SLV COMPR DVT MDII -B: Performed by: SURGERY

## 2019-07-25 PROCEDURE — 65660000000 HC RM CCU STEPDOWN

## 2019-07-25 PROCEDURE — 77030009852 HC PCH RTVR ENDOSC COVD -B: Performed by: SURGERY

## 2019-07-25 PROCEDURE — 76210000006 HC OR PH I REC 0.5 TO 1 HR: Performed by: SURGERY

## 2019-07-25 PROCEDURE — 77030020782 HC GWN BAIR PAWS FLX 3M -B

## 2019-07-25 PROCEDURE — 77030010286 HC STPLR ENDOSC COVD -D: Performed by: SURGERY

## 2019-07-25 PROCEDURE — 77030037032 HC INSRT SCIS CLICKLLINE DISP STOR -B: Performed by: SURGERY

## 2019-07-25 PROCEDURE — 77030018836 HC SOL IRR NACL ICUM -A: Performed by: SURGERY

## 2019-07-25 PROCEDURE — 77030012407 HC DRN WND BARD -B: Performed by: SURGERY

## 2019-07-25 PROCEDURE — 77030038593 HC SUT VLOC ABSRB COVD -B: Performed by: SURGERY

## 2019-07-25 PROCEDURE — 77030031139 HC SUT VCRL2 J&J -A: Performed by: SURGERY

## 2019-07-25 PROCEDURE — 74011000250 HC RX REV CODE- 250

## 2019-07-25 PROCEDURE — 77030035042 HC TRCR ENDOSC OPTCL BLDLSS COVD -D: Performed by: SURGERY

## 2019-07-25 PROCEDURE — 77030037367 HC STPLR ENDO TRI-STPLR COVD -D: Performed by: SURGERY

## 2019-07-25 PROCEDURE — 77030020053 HC ELECTRD LAPSCP COVD -B: Performed by: SURGERY

## 2019-07-25 PROCEDURE — 77030030826 HC RETRCTR WND ALEXS AMR -B: Performed by: SURGERY

## 2019-07-25 PROCEDURE — 77030011640 HC PAD GRND REM COVD -A: Performed by: SURGERY

## 2019-07-25 PROCEDURE — 74011000250 HC RX REV CODE- 250: Performed by: SURGERY

## 2019-07-25 PROCEDURE — 0D164ZA BYPASS STOMACH TO JEJUNUM, PERCUTANEOUS ENDOSCOPIC APPROACH: ICD-10-PCS | Performed by: SURGERY

## 2019-07-25 PROCEDURE — 74011250636 HC RX REV CODE- 250/636

## 2019-07-25 PROCEDURE — 77030032435: Performed by: SURGERY

## 2019-07-25 PROCEDURE — 77030020747 HC TU INSUF ENDOSC TELE -A: Performed by: SURGERY

## 2019-07-25 PROCEDURE — 77030012029 HC APPL CLP LIG COVD -C: Performed by: SURGERY

## 2019-07-25 PROCEDURE — 77030034821 HC DEV ENDOSC DST ORVIL COVD -C: Performed by: SURGERY

## 2019-07-25 PROCEDURE — 77030002916 HC SUT ETHLN J&J -A: Performed by: SURGERY

## 2019-07-25 PROCEDURE — 76060000037 HC ANESTHESIA 3 TO 3.5 HR: Performed by: SURGERY

## 2019-07-25 PROCEDURE — 82962 GLUCOSE BLOOD TEST: CPT

## 2019-07-25 PROCEDURE — 74011000258 HC RX REV CODE- 258: Performed by: SURGERY

## 2019-07-25 PROCEDURE — 77030008756 HC TU IRR SUC STRY -B: Performed by: SURGERY

## 2019-07-25 PROCEDURE — 77030008684 HC TU ET CUF COVD -B: Performed by: NURSE ANESTHETIST, CERTIFIED REGISTERED

## 2019-07-25 PROCEDURE — 77030035048 HC TRCR ENDOSC OPTCL COVD -B: Performed by: SURGERY

## 2019-07-25 PROCEDURE — P9045 ALBUMIN (HUMAN), 5%, 250 ML: HCPCS

## 2019-07-25 PROCEDURE — 74011250636 HC RX REV CODE- 250/636: Performed by: SURGERY

## 2019-07-25 PROCEDURE — 77030037186 HC VLV ENDOSC STRL DEFENDO DISP MVAT -A: Performed by: SURGERY

## 2019-07-25 PROCEDURE — 77030035045 HC TRCR ENDOSC VRSPRT BLDLSS COVD -B: Performed by: SURGERY

## 2019-07-25 PROCEDURE — 77030008728 HC TU GAST LAPSCP APOL -C: Performed by: SURGERY

## 2019-07-25 PROCEDURE — 77030039266 HC ADH SKN EXOFIN S2SG -A: Performed by: SURGERY

## 2019-07-25 PROCEDURE — 77030013567 HC DRN WND RESERV BARD -A: Performed by: SURGERY

## 2019-07-25 PROCEDURE — 77030009965 HC RELD STPLR ENDOS COVD -D: Performed by: SURGERY

## 2019-07-25 PROCEDURE — 74011250637 HC RX REV CODE- 250/637: Performed by: SURGERY

## 2019-07-25 PROCEDURE — 77030016151 HC PROTCTR LNS DFOG COVD -B: Performed by: SURGERY

## 2019-07-25 PROCEDURE — 77030035051: Performed by: SURGERY

## 2019-07-25 PROCEDURE — 77030022704 HC SUT VLOC COVD -B: Performed by: SURGERY

## 2019-07-25 RX ORDER — PROPOFOL 10 MG/ML
INJECTION, EMULSION INTRAVENOUS AS NEEDED
Status: DISCONTINUED | OUTPATIENT
Start: 2019-07-25 | End: 2019-07-25 | Stop reason: HOSPADM

## 2019-07-25 RX ORDER — SODIUM CHLORIDE 0.9 % (FLUSH) 0.9 %
5-40 SYRINGE (ML) INJECTION AS NEEDED
Status: DISCONTINUED | OUTPATIENT
Start: 2019-07-25 | End: 2019-07-25 | Stop reason: HOSPADM

## 2019-07-25 RX ORDER — LIDOCAINE HYDROCHLORIDE 10 MG/ML
0.1 INJECTION, SOLUTION EPIDURAL; INFILTRATION; INTRACAUDAL; PERINEURAL AS NEEDED
Status: DISCONTINUED | OUTPATIENT
Start: 2019-07-25 | End: 2019-07-25 | Stop reason: HOSPADM

## 2019-07-25 RX ORDER — HYDROMORPHONE HYDROCHLORIDE 1 MG/ML
1 INJECTION, SOLUTION INTRAMUSCULAR; INTRAVENOUS; SUBCUTANEOUS
Status: DISCONTINUED | OUTPATIENT
Start: 2019-07-25 | End: 2019-07-27 | Stop reason: HOSPADM

## 2019-07-25 RX ORDER — SODIUM CHLORIDE 0.9 % (FLUSH) 0.9 %
5-40 SYRINGE (ML) INJECTION EVERY 8 HOURS
Status: DISCONTINUED | OUTPATIENT
Start: 2019-07-25 | End: 2019-07-27 | Stop reason: HOSPADM

## 2019-07-25 RX ORDER — MIDAZOLAM HYDROCHLORIDE 1 MG/ML
1 INJECTION, SOLUTION INTRAMUSCULAR; INTRAVENOUS AS NEEDED
Status: DISCONTINUED | OUTPATIENT
Start: 2019-07-25 | End: 2019-07-25 | Stop reason: HOSPADM

## 2019-07-25 RX ORDER — LIDOCAINE HYDROCHLORIDE ANHYDROUS AND DEXTROSE MONOHYDRATE .8; 5 G/100ML; G/100ML
INJECTION, SOLUTION INTRAVENOUS
Status: DISCONTINUED | OUTPATIENT
Start: 2019-07-25 | End: 2019-07-25 | Stop reason: HOSPADM

## 2019-07-25 RX ORDER — SODIUM CHLORIDE 0.9 % (FLUSH) 0.9 %
5-40 SYRINGE (ML) INJECTION EVERY 8 HOURS
Status: DISCONTINUED | OUTPATIENT
Start: 2019-07-25 | End: 2019-07-25 | Stop reason: HOSPADM

## 2019-07-25 RX ORDER — MIDAZOLAM HYDROCHLORIDE 1 MG/ML
0.5 INJECTION, SOLUTION INTRAMUSCULAR; INTRAVENOUS
Status: DISCONTINUED | OUTPATIENT
Start: 2019-07-25 | End: 2019-07-25 | Stop reason: HOSPADM

## 2019-07-25 RX ORDER — ACETAMINOPHEN 325 MG/1
650 TABLET ORAL ONCE
Status: DISCONTINUED | OUTPATIENT
Start: 2019-07-25 | End: 2019-07-25 | Stop reason: SDUPTHER

## 2019-07-25 RX ORDER — LIDOCAINE HYDROCHLORIDE 20 MG/ML
INJECTION, SOLUTION EPIDURAL; INFILTRATION; INTRACAUDAL; PERINEURAL AS NEEDED
Status: DISCONTINUED | OUTPATIENT
Start: 2019-07-25 | End: 2019-07-25 | Stop reason: HOSPADM

## 2019-07-25 RX ORDER — SODIUM CHLORIDE, SODIUM LACTATE, POTASSIUM CHLORIDE, CALCIUM CHLORIDE 600; 310; 30; 20 MG/100ML; MG/100ML; MG/100ML; MG/100ML
125 INJECTION, SOLUTION INTRAVENOUS CONTINUOUS
Status: DISCONTINUED | OUTPATIENT
Start: 2019-07-25 | End: 2019-07-27 | Stop reason: HOSPADM

## 2019-07-25 RX ORDER — FENTANYL CITRATE 50 UG/ML
INJECTION, SOLUTION INTRAMUSCULAR; INTRAVENOUS AS NEEDED
Status: DISCONTINUED | OUTPATIENT
Start: 2019-07-25 | End: 2019-07-25 | Stop reason: HOSPADM

## 2019-07-25 RX ORDER — GABAPENTIN 250 MG/5ML
500 SOLUTION ORAL ONCE
Status: COMPLETED | OUTPATIENT
Start: 2019-07-25 | End: 2019-07-25

## 2019-07-25 RX ORDER — NALOXONE HYDROCHLORIDE 0.4 MG/ML
0.4 INJECTION, SOLUTION INTRAMUSCULAR; INTRAVENOUS; SUBCUTANEOUS AS NEEDED
Status: DISCONTINUED | OUTPATIENT
Start: 2019-07-25 | End: 2019-07-27 | Stop reason: HOSPADM

## 2019-07-25 RX ORDER — SCOLOPAMINE TRANSDERMAL SYSTEM 1 MG/1
1 PATCH, EXTENDED RELEASE TRANSDERMAL ONCE
Status: DISCONTINUED | OUTPATIENT
Start: 2019-07-25 | End: 2019-07-27 | Stop reason: HOSPADM

## 2019-07-25 RX ORDER — DIPHENHYDRAMINE HYDROCHLORIDE 50 MG/ML
12.5 INJECTION, SOLUTION INTRAMUSCULAR; INTRAVENOUS
Status: ACTIVE | OUTPATIENT
Start: 2019-07-25 | End: 2019-07-26

## 2019-07-25 RX ORDER — ALBUMIN HUMAN 50 G/1000ML
SOLUTION INTRAVENOUS AS NEEDED
Status: DISCONTINUED | OUTPATIENT
Start: 2019-07-25 | End: 2019-07-25 | Stop reason: HOSPADM

## 2019-07-25 RX ORDER — SODIUM CHLORIDE 0.9 % (FLUSH) 0.9 %
5-40 SYRINGE (ML) INJECTION AS NEEDED
Status: DISCONTINUED | OUTPATIENT
Start: 2019-07-25 | End: 2019-07-27 | Stop reason: HOSPADM

## 2019-07-25 RX ORDER — LIDOCAINE HYDROCHLORIDE ANHYDROUS AND DEXTROSE MONOHYDRATE .8; 5 G/100ML; G/100ML
1 INJECTION, SOLUTION INTRAVENOUS CONTINUOUS
Status: ACTIVE | OUTPATIENT
Start: 2019-07-25 | End: 2019-07-26

## 2019-07-25 RX ORDER — ROPIVACAINE HYDROCHLORIDE 5 MG/ML
30 INJECTION, SOLUTION EPIDURAL; INFILTRATION; PERINEURAL AS NEEDED
Status: DISCONTINUED | OUTPATIENT
Start: 2019-07-25 | End: 2019-07-25 | Stop reason: HOSPADM

## 2019-07-25 RX ORDER — ONDANSETRON 2 MG/ML
INJECTION INTRAMUSCULAR; INTRAVENOUS AS NEEDED
Status: DISCONTINUED | OUTPATIENT
Start: 2019-07-25 | End: 2019-07-25 | Stop reason: HOSPADM

## 2019-07-25 RX ORDER — EPHEDRINE SULFATE 50 MG/ML
INJECTION, SOLUTION INTRAVENOUS AS NEEDED
Status: DISCONTINUED | OUTPATIENT
Start: 2019-07-25 | End: 2019-07-25 | Stop reason: HOSPADM

## 2019-07-25 RX ORDER — KETAMINE HYDROCHLORIDE 50 MG/ML
INJECTION, SOLUTION INTRAMUSCULAR; INTRAVENOUS AS NEEDED
Status: DISCONTINUED | OUTPATIENT
Start: 2019-07-25 | End: 2019-07-25 | Stop reason: HOSPADM

## 2019-07-25 RX ORDER — SODIUM CHLORIDE, SODIUM LACTATE, POTASSIUM CHLORIDE, CALCIUM CHLORIDE 600; 310; 30; 20 MG/100ML; MG/100ML; MG/100ML; MG/100ML
25 INJECTION, SOLUTION INTRAVENOUS CONTINUOUS
Status: DISCONTINUED | OUTPATIENT
Start: 2019-07-25 | End: 2019-07-25 | Stop reason: HOSPADM

## 2019-07-25 RX ORDER — OXYCODONE HYDROCHLORIDE 5 MG/1
5 TABLET ORAL
Status: DISCONTINUED | OUTPATIENT
Start: 2019-07-25 | End: 2019-07-26

## 2019-07-25 RX ORDER — MAGNESIUM SULFATE HEPTAHYDRATE 40 MG/ML
INJECTION, SOLUTION INTRAVENOUS AS NEEDED
Status: DISCONTINUED | OUTPATIENT
Start: 2019-07-25 | End: 2019-07-25 | Stop reason: HOSPADM

## 2019-07-25 RX ORDER — ACETAMINOPHEN 10 MG/ML
1000 INJECTION, SOLUTION INTRAVENOUS EVERY 6 HOURS
Status: COMPLETED | OUTPATIENT
Start: 2019-07-25 | End: 2019-07-26

## 2019-07-25 RX ORDER — FENTANYL CITRATE 50 UG/ML
25 INJECTION, SOLUTION INTRAMUSCULAR; INTRAVENOUS
Status: DISCONTINUED | OUTPATIENT
Start: 2019-07-25 | End: 2019-07-25 | Stop reason: HOSPADM

## 2019-07-25 RX ORDER — MIDAZOLAM HYDROCHLORIDE 1 MG/ML
INJECTION, SOLUTION INTRAMUSCULAR; INTRAVENOUS AS NEEDED
Status: DISCONTINUED | OUTPATIENT
Start: 2019-07-25 | End: 2019-07-25 | Stop reason: HOSPADM

## 2019-07-25 RX ORDER — BUPIVACAINE HYDROCHLORIDE AND EPINEPHRINE 5; 5 MG/ML; UG/ML
INJECTION, SOLUTION EPIDURAL; INTRACAUDAL; PERINEURAL AS NEEDED
Status: DISCONTINUED | OUTPATIENT
Start: 2019-07-25 | End: 2019-07-25 | Stop reason: HOSPADM

## 2019-07-25 RX ORDER — NEOSTIGMINE METHYLSULFATE 1 MG/ML
INJECTION INTRAVENOUS AS NEEDED
Status: DISCONTINUED | OUTPATIENT
Start: 2019-07-25 | End: 2019-07-25 | Stop reason: HOSPADM

## 2019-07-25 RX ORDER — DIPHENHYDRAMINE HYDROCHLORIDE 50 MG/ML
12.5 INJECTION, SOLUTION INTRAMUSCULAR; INTRAVENOUS AS NEEDED
Status: DISCONTINUED | OUTPATIENT
Start: 2019-07-25 | End: 2019-07-25 | Stop reason: HOSPADM

## 2019-07-25 RX ORDER — GLYCOPYRROLATE 0.2 MG/ML
INJECTION INTRAMUSCULAR; INTRAVENOUS AS NEEDED
Status: DISCONTINUED | OUTPATIENT
Start: 2019-07-25 | End: 2019-07-25 | Stop reason: HOSPADM

## 2019-07-25 RX ORDER — ONDANSETRON 2 MG/ML
4 INJECTION INTRAMUSCULAR; INTRAVENOUS
Status: DISCONTINUED | OUTPATIENT
Start: 2019-07-25 | End: 2019-07-27 | Stop reason: HOSPADM

## 2019-07-25 RX ORDER — HYDROMORPHONE HYDROCHLORIDE 1 MG/ML
0.2 INJECTION, SOLUTION INTRAMUSCULAR; INTRAVENOUS; SUBCUTANEOUS
Status: DISCONTINUED | OUTPATIENT
Start: 2019-07-25 | End: 2019-07-25 | Stop reason: HOSPADM

## 2019-07-25 RX ORDER — PHENYLEPHRINE HCL IN 0.9% NACL 0.4MG/10ML
SYRINGE (ML) INTRAVENOUS AS NEEDED
Status: DISCONTINUED | OUTPATIENT
Start: 2019-07-25 | End: 2019-07-25 | Stop reason: HOSPADM

## 2019-07-25 RX ORDER — HYDRALAZINE HYDROCHLORIDE 20 MG/ML
20 INJECTION INTRAMUSCULAR; INTRAVENOUS
Status: DISCONTINUED | OUTPATIENT
Start: 2019-07-25 | End: 2019-07-27 | Stop reason: HOSPADM

## 2019-07-25 RX ORDER — PANTOPRAZOLE SODIUM 40 MG/1
40 TABLET, DELAYED RELEASE ORAL
Status: DISCONTINUED | OUTPATIENT
Start: 2019-07-26 | End: 2019-07-27 | Stop reason: HOSPADM

## 2019-07-25 RX ORDER — FENTANYL CITRATE 50 UG/ML
50 INJECTION, SOLUTION INTRAMUSCULAR; INTRAVENOUS AS NEEDED
Status: DISCONTINUED | OUTPATIENT
Start: 2019-07-25 | End: 2019-07-25 | Stop reason: HOSPADM

## 2019-07-25 RX ORDER — SUCCINYLCHOLINE CHLORIDE 20 MG/ML
INJECTION INTRAMUSCULAR; INTRAVENOUS AS NEEDED
Status: DISCONTINUED | OUTPATIENT
Start: 2019-07-25 | End: 2019-07-25 | Stop reason: HOSPADM

## 2019-07-25 RX ORDER — ONDANSETRON 2 MG/ML
4 INJECTION INTRAMUSCULAR; INTRAVENOUS AS NEEDED
Status: DISCONTINUED | OUTPATIENT
Start: 2019-07-25 | End: 2019-07-25 | Stop reason: HOSPADM

## 2019-07-25 RX ORDER — SODIUM CHLORIDE 9 MG/ML
25 INJECTION, SOLUTION INTRAVENOUS CONTINUOUS
Status: DISCONTINUED | OUTPATIENT
Start: 2019-07-25 | End: 2019-07-25 | Stop reason: HOSPADM

## 2019-07-25 RX ORDER — GABAPENTIN 100 MG/1
200 CAPSULE ORAL 2 TIMES DAILY
Status: DISCONTINUED | OUTPATIENT
Start: 2019-07-25 | End: 2019-07-27 | Stop reason: HOSPADM

## 2019-07-25 RX ORDER — HYOSCYAMINE SULFATE 0.12 MG/1
0.12 TABLET SUBLINGUAL
Status: DISCONTINUED | OUTPATIENT
Start: 2019-07-25 | End: 2019-07-27 | Stop reason: HOSPADM

## 2019-07-25 RX ORDER — OXYCODONE HYDROCHLORIDE 5 MG/1
5 TABLET ORAL AS NEEDED
Status: DISCONTINUED | OUTPATIENT
Start: 2019-07-25 | End: 2019-07-25 | Stop reason: HOSPADM

## 2019-07-25 RX ORDER — MORPHINE SULFATE 10 MG/ML
2 INJECTION, SOLUTION INTRAMUSCULAR; INTRAVENOUS
Status: DISCONTINUED | OUTPATIENT
Start: 2019-07-25 | End: 2019-07-25 | Stop reason: HOSPADM

## 2019-07-25 RX ORDER — ROCURONIUM BROMIDE 10 MG/ML
INJECTION, SOLUTION INTRAVENOUS AS NEEDED
Status: DISCONTINUED | OUTPATIENT
Start: 2019-07-25 | End: 2019-07-25 | Stop reason: HOSPADM

## 2019-07-25 RX ORDER — SODIUM CHLORIDE, SODIUM LACTATE, POTASSIUM CHLORIDE, CALCIUM CHLORIDE 600; 310; 30; 20 MG/100ML; MG/100ML; MG/100ML; MG/100ML
100 INJECTION, SOLUTION INTRAVENOUS CONTINUOUS
Status: DISCONTINUED | OUTPATIENT
Start: 2019-07-25 | End: 2019-07-25 | Stop reason: HOSPADM

## 2019-07-25 RX ORDER — DEXAMETHASONE SODIUM PHOSPHATE 4 MG/ML
INJECTION, SOLUTION INTRA-ARTICULAR; INTRALESIONAL; INTRAMUSCULAR; INTRAVENOUS; SOFT TISSUE AS NEEDED
Status: DISCONTINUED | OUTPATIENT
Start: 2019-07-25 | End: 2019-07-25 | Stop reason: HOSPADM

## 2019-07-25 RX ORDER — DEXMEDETOMIDINE HYDROCHLORIDE 4 UG/ML
INJECTION, SOLUTION INTRAVENOUS AS NEEDED
Status: DISCONTINUED | OUTPATIENT
Start: 2019-07-25 | End: 2019-07-25 | Stop reason: HOSPADM

## 2019-07-25 RX ADMIN — Medication 80 MCG: at 13:13

## 2019-07-25 RX ADMIN — DEXMEDETOMIDINE HYDROCHLORIDE 4 MCG: 4 INJECTION, SOLUTION INTRAVENOUS at 14:37

## 2019-07-25 RX ADMIN — LIDOCAINE HYDROCHLORIDE 100 MG: 20 INJECTION, SOLUTION EPIDURAL; INFILTRATION; INTRACAUDAL; PERINEURAL at 11:54

## 2019-07-25 RX ADMIN — DEXMEDETOMIDINE HYDROCHLORIDE 4 MCG: 4 INJECTION, SOLUTION INTRAVENOUS at 14:27

## 2019-07-25 RX ADMIN — SODIUM CHLORIDE, POTASSIUM CHLORIDE, SODIUM LACTATE AND CALCIUM CHLORIDE: 600; 310; 30; 20 INJECTION, SOLUTION INTRAVENOUS at 14:09

## 2019-07-25 RX ADMIN — DEXMEDETOMIDINE HYDROCHLORIDE 4 MCG: 4 INJECTION, SOLUTION INTRAVENOUS at 14:33

## 2019-07-25 RX ADMIN — ACETAMINOPHEN 1000 MG: 10 INJECTION, SOLUTION INTRAVENOUS at 17:15

## 2019-07-25 RX ADMIN — LIDOCAINE HYDROCHLORIDE ANHYDROUS AND DEXTROSE MONOHYDRATE 2 MG/KG/HR: .8; 5 INJECTION, SOLUTION INTRAVENOUS at 11:59

## 2019-07-25 RX ADMIN — MIDAZOLAM HYDROCHLORIDE 2 MG: 1 INJECTION, SOLUTION INTRAMUSCULAR; INTRAVENOUS at 11:45

## 2019-07-25 RX ADMIN — DEXAMETHASONE SODIUM PHOSPHATE 8 MG: 4 INJECTION, SOLUTION INTRA-ARTICULAR; INTRALESIONAL; INTRAMUSCULAR; INTRAVENOUS; SOFT TISSUE at 12:00

## 2019-07-25 RX ADMIN — GLYCOPYRROLATE 0.4 MG: 0.2 INJECTION INTRAMUSCULAR; INTRAVENOUS at 14:26

## 2019-07-25 RX ADMIN — ROCURONIUM BROMIDE 10 MG: 10 INJECTION, SOLUTION INTRAVENOUS at 11:54

## 2019-07-25 RX ADMIN — ONDANSETRON 4 MG: 2 INJECTION INTRAMUSCULAR; INTRAVENOUS at 14:26

## 2019-07-25 RX ADMIN — ALBUMIN HUMAN 250 ML: 50 SOLUTION INTRAVENOUS at 12:05

## 2019-07-25 RX ADMIN — EPHEDRINE SULFATE 5 MG: 50 INJECTION, SOLUTION INTRAVENOUS at 13:41

## 2019-07-25 RX ADMIN — SODIUM CHLORIDE, SODIUM LACTATE, POTASSIUM CHLORIDE, AND CALCIUM CHLORIDE 125 ML/HR: 600; 310; 30; 20 INJECTION, SOLUTION INTRAVENOUS at 17:56

## 2019-07-25 RX ADMIN — Medication 120 MCG: at 13:10

## 2019-07-25 RX ADMIN — SUCCINYLCHOLINE CHLORIDE 140 MG: 20 INJECTION INTRAMUSCULAR; INTRAVENOUS at 11:55

## 2019-07-25 RX ADMIN — NEOSTIGMINE METHYLSULFATE 3 MG: 1 INJECTION INTRAVENOUS at 14:26

## 2019-07-25 RX ADMIN — DEXMEDETOMIDINE HYDROCHLORIDE 4 MCG: 4 INJECTION, SOLUTION INTRAVENOUS at 14:41

## 2019-07-25 RX ADMIN — Medication 80 MCG: at 12:16

## 2019-07-25 RX ADMIN — KETAMINE HYDROCHLORIDE 50 MG: 50 INJECTION, SOLUTION INTRAMUSCULAR; INTRAVENOUS at 11:59

## 2019-07-25 RX ADMIN — GABAPENTIN 500 MG: 250 SOLUTION ORAL at 10:24

## 2019-07-25 RX ADMIN — PROPOFOL 180 MG: 10 INJECTION, EMULSION INTRAVENOUS at 11:54

## 2019-07-25 RX ADMIN — DEXMEDETOMIDINE HYDROCHLORIDE 4 MCG: 4 INJECTION, SOLUTION INTRAVENOUS at 14:23

## 2019-07-25 RX ADMIN — EPHEDRINE SULFATE 10 MG: 50 INJECTION, SOLUTION INTRAVENOUS at 13:16

## 2019-07-25 RX ADMIN — CEFOTETAN DISODIUM 2 G: 2 INJECTION, POWDER, FOR SOLUTION INTRAMUSCULAR; INTRAVENOUS at 12:07

## 2019-07-25 RX ADMIN — Medication 80 MCG: at 12:19

## 2019-07-25 RX ADMIN — ACETAMINOPHEN 1000 MG: 10 INJECTION, SOLUTION INTRAVENOUS at 23:44

## 2019-07-25 RX ADMIN — GABAPENTIN 200 MG: 100 CAPSULE ORAL at 17:55

## 2019-07-25 RX ADMIN — SODIUM CHLORIDE, POTASSIUM CHLORIDE, SODIUM LACTATE AND CALCIUM CHLORIDE: 600; 310; 30; 20 INJECTION, SOLUTION INTRAVENOUS at 11:30

## 2019-07-25 RX ADMIN — ROCURONIUM BROMIDE 40 MG: 10 INJECTION, SOLUTION INTRAVENOUS at 12:00

## 2019-07-25 RX ADMIN — ACETAMINOPHEN 1000 MG: 650 SOLUTION ORAL at 10:26

## 2019-07-25 RX ADMIN — PROPOFOL 100 MG: 10 INJECTION, EMULSION INTRAVENOUS at 13:41

## 2019-07-25 RX ADMIN — EPHEDRINE SULFATE 10 MG: 50 INJECTION, SOLUTION INTRAVENOUS at 13:13

## 2019-07-25 RX ADMIN — MAGNESIUM SULFATE HEPTAHYDRATE 2 G: 40 INJECTION, SOLUTION INTRAVENOUS at 12:05

## 2019-07-25 RX ADMIN — FENTANYL CITRATE 100 MCG: 50 INJECTION, SOLUTION INTRAMUSCULAR; INTRAVENOUS at 11:54

## 2019-07-25 NOTE — BRIEF OP NOTE
BRIEF OPERATIVE NOTE    Date of Procedure: 7/25/2019   Preoperative Diagnosis: MORBID OBESITY  Postoperative Diagnosis: MORBID OBESITY    Procedure(s):  LAPAROSCOPIC KATY EN Y GASTRIC BYPASS  (E R A S)  ESOPHAGOGASTRODUODENOSCOPY (EGD)  Surgeon(s) and Role:     * Kymberly Read MD - Primary         Surgical Assistant: SHERRY Galvan    Surgical Staff:  Circ-1: Montserrat Kelley  Circ-Relief: Jeremías Jones RN  Physician Assistant: SHERRY Dozier Ra  Scrub RN-1: Lara Macias  Scrub RN-Relief: Virgen Mcdowell RN  Float Staff: Ravinder Dupree RN  Event Time In Time Out   Incision Start 1224    Incision Close       Anesthesia: General   Estimated Blood Loss: 10cc  Specimens:   ID Type Source Tests Collected by Time Destination   1 : Anastomotic rings x 2 and portion of small bowel Fresh Small Bowel  Kymberly Read MD 7/25/2019 1355 Pathology      Findings: normal gastric bypass 150cm Katy limb, antecolic antegastric, normal post op EGD, negative leak test   Complications: none  Implants: * No implants in log *

## 2019-07-25 NOTE — PERIOP NOTES
Endoscope was pre-cleaned at bedside immediately following procedure by MAURO Godwin . Button lot # [id number], expiration date 05/2022.  (if applicable)

## 2019-07-25 NOTE — ROUTINE PROCESS
Patient: Indy Selby MRN: [id number]  SSN: [id number]   YOB: 1960  Age: 64 y.o. Sex: male     Patient is status post Procedure(s):  LAPAROSCOPIC GASTRIC BYPASS  (E R A S)  ESOPHAGOGASTRODUODENOSCOPY (EGD). Surgeon(s) and Role:     * Madhav Bacon MD - Primary    Local/Dose/Irrigation:  See Zahra Webberk #1 07/25/19 Left Abdomen (Active)   Site Assessment Clean, dry, & intact 7/25/2019  2:16 PM   Dressing Status Clean, dry, & intact 7/25/2019  2:16 PM      Airway - Endotracheal Tube 07/25/19 Oral (Active)                   Dressing/Packing:  Wound Abdomen 5 trocar sites-Dressing Type: Sutures; Topical skin adhesive/glue(Exofin) (07/25/19 1515)  Wound Abdomen Left Drain exit site-Dressing Type:  Adhesive wound dressing (Band-Aid) (07/25/19 9101)    Splint/Cast:  ]    Other:  SCDs

## 2019-07-25 NOTE — PERIOP NOTES
TRANSFER - OUT REPORT:    Verbal report given to Rosemarie(name) on [address]  being transferred to 5E(unit) for routine post - op       Report consisted of patients Situation, Background, Assessment and   Recommendations(SBAR). Time Pre op antibiotic given:1207  Anesthesia Stop time: 1501  Freeman Present on Transfer to floor:no  Order for Freeman on Chart:no  Discharge Prescriptions with Chart:no    Information from the following report(s) OR Summary was reviewed with the receiving nurse. Opportunity for questions and clarification was provided. Pt , no coverage at this time per Dr. Soriano. Is the patient on 02? YES       L/Min 2         Is the patient on a monitor? NO    Is the nurse transporting with the patient? NO    Surgical Waiting Area notified of patient's transfer from PACU? YES      The following personal items collected during your admission accompanied patient upon transfer:   Dental Appliance: Dental Appliances: None  Vision:    Hearing Aid:    Jewelry: Jewelry: None  Clothing: Clothing: (clothes and glasses returned to pt in pacu)  Other Valuables:  Other Valuables: Eyeglasses(glasses topacu)  Valuables sent to safe:

## 2019-07-25 NOTE — PERIOP NOTES
UNABLE TO SPEAK TO PATIENT'S contact utilizing volunteer phone # [phone], [place], TO [phone] [facility]. THIS NURSE LEFT A MESSAGE WITH THE SURGICAL WAITING AREA VOLUNTEER.

## 2019-07-25 NOTE — ANESTHESIA POSTPROCEDURE EVALUATION
Post-Anesthesia Evaluation and Assessment    Patient: Adrienne Easton MRN: [id number]  SSN: [id number]    YOB: 1960  Age: 64 y.o. Sex: male      I have evaluated the patient and they are stable and ready for discharge from the PACU. Cardiovascular Function/Vital Signs  Visit Vitals  /89   Pulse 87   Temp 36.4 °C (97.6 °F)   Resp 12   Ht 5' 9\" (1.753 m)   Wt 116.7 kg (257 lb 5 oz)   SpO2 95%   BMI 38.00 kg/m²       Patient is status post General anesthesia for Procedure(s):  LAPAROSCOPIC GASTRIC BYPASS  (E R A S)  ESOPHAGOGASTRODUODENOSCOPY (EGD). Nausea/Vomiting: None    Postoperative hydration reviewed and adequate. Pain:  Pain Scale 1: Numeric (0 - 10) (07/25/19 1009)  Pain Intensity 1: 0 (07/25/19 1009)   Managed    Neurological Status: At baseline    Mental Status, Level of Consciousness: Alert and  oriented to person, place, and time    Pulmonary Status:   O2 Device: Nasal cannula;Oral airway (07/25/19 1500)   Adequate oxygenation and airway patent    Complications related to anesthesia: None    Post-anesthesia assessment completed. No concerns    Signed By: Isela Tang MD     July 25, 2019              Procedure(s):  LAPAROSCOPIC GASTRIC BYPASS  (E R A S)  ESOPHAGOGASTRODUODENOSCOPY (EGD). general    <BSHSIANPOST>    Vitals Value Taken Time   /86 7/25/2019  3:05 PM   Temp 36.4 °C (97.6 °F) 7/25/2019  3:00 PM   Pulse 84 7/25/2019  3:06 PM   Resp 25 7/25/2019  3:06 PM   SpO2 94 % 7/25/2019  3:06 PM   Vitals shown include unvalidated device data.

## 2019-07-25 NOTE — ANESTHESIA PREPROCEDURE EVALUATION
Relevant Problems   No relevant active problems       Anesthetic History   No history of anesthetic complications            Review of Systems / Medical History  Patient summary reviewed, nursing notes reviewed and pertinent labs reviewed    Pulmonary  Within defined limits                 Neuro/Psych   Within defined limits           Cardiovascular    Hypertension              Exercise tolerance: >4 METS     GI/Hepatic/Renal         Renal disease: CRI       Endo/Other    Diabetes    Obesity and arthritis     Other Findings              Physical Exam    Airway  Mallampati: II  TM Distance: 4 - 6 cm  Neck ROM: normal range of motion   Mouth opening: Normal     Cardiovascular  Regular rate and rhythm,  S1 and S2 normal,  no murmur, click, rub, or gallop             Dental    Dentition: Implants, Upper dentition intact and Lower dentition intact     Pulmonary  Breath sounds clear to auscultation               Abdominal  GI exam deferred       Other Findings            Anesthetic Plan    ASA: 3  Anesthesia type: general          Induction: Intravenous  Anesthetic plan and risks discussed with: Patient

## 2019-07-25 NOTE — INTERVAL H&P NOTE
H&P Update:  Crow Villanueva was seen and examined. History and physical has been reviewed. The patient has been examined. There have been no significant clinical changes since the completion of the originally dated History and Physical.  Patient identified by surgeon; surgical site was confirmed by patient and surgeon.

## 2019-07-26 ENCOUNTER — DOCUMENTATION ONLY (OUTPATIENT)
Dept: SURGERY | Age: 59
End: 2019-07-26

## 2019-07-26 LAB
ANION GAP SERPL CALC-SCNC: 8 MMOL/L (ref 5–15)
BUN SERPL-MCNC: 18 MG/DL (ref 6–20)
BUN/CREAT SERPL: 12 (ref 12–20)
CALCIUM SERPL-MCNC: 8.6 MG/DL (ref 8.5–10.1)
CHLORIDE SERPL-SCNC: 106 MMOL/L (ref 97–108)
CO2 SERPL-SCNC: 26 MMOL/L (ref 21–32)
CREAT SERPL-MCNC: 1.47 MG/DL (ref 0.7–1.3)
ERYTHROCYTE [DISTWIDTH] IN BLOOD BY AUTOMATED COUNT: 14.6 % (ref 11.5–14.5)
GLUCOSE BLD STRIP.AUTO-MCNC: 158 MG/DL (ref 65–100)
GLUCOSE BLD STRIP.AUTO-MCNC: 163 MG/DL (ref 65–100)
GLUCOSE BLD STRIP.AUTO-MCNC: 175 MG/DL (ref 65–100)
GLUCOSE SERPL-MCNC: 187 MG/DL (ref 65–100)
HCT VFR BLD AUTO: 43.2 % (ref 36.6–50.3)
HGB BLD-MCNC: 12.7 G/DL (ref 12.1–17)
MCH RBC QN AUTO: 22.4 PG (ref 26–34)
MCHC RBC AUTO-ENTMCNC: 29.4 G/DL (ref 30–36.5)
MCV RBC AUTO: 76.3 FL (ref 80–99)
NRBC # BLD: 0 K/UL (ref 0–0.01)
NRBC BLD-RTO: 0 PER 100 WBC
PLATELET # BLD AUTO: 318 K/UL (ref 150–400)
PMV BLD AUTO: 11.3 FL (ref 8.9–12.9)
POTASSIUM SERPL-SCNC: 4.5 MMOL/L (ref 3.5–5.1)
RBC # BLD AUTO: 5.66 M/UL (ref 4.1–5.7)
SERVICE CMNT-IMP: ABNORMAL
SODIUM SERPL-SCNC: 140 MMOL/L (ref 136–145)
WBC # BLD AUTO: 12.3 K/UL (ref 4.1–11.1)

## 2019-07-26 PROCEDURE — 85027 COMPLETE CBC AUTOMATED: CPT

## 2019-07-26 PROCEDURE — 65660000000 HC RM CCU STEPDOWN

## 2019-07-26 PROCEDURE — 74011636637 HC RX REV CODE- 636/637: Performed by: SURGERY

## 2019-07-26 PROCEDURE — 80048 BASIC METABOLIC PNL TOTAL CA: CPT

## 2019-07-26 PROCEDURE — 82962 GLUCOSE BLOOD TEST: CPT

## 2019-07-26 PROCEDURE — 74011250636 HC RX REV CODE- 250/636: Performed by: SURGERY

## 2019-07-26 PROCEDURE — 36415 COLL VENOUS BLD VENIPUNCTURE: CPT

## 2019-07-26 PROCEDURE — 74011250637 HC RX REV CODE- 250/637: Performed by: NURSE PRACTITIONER

## 2019-07-26 PROCEDURE — 74011250637 HC RX REV CODE- 250/637: Performed by: SURGERY

## 2019-07-26 RX ORDER — HYDROCHLOROTHIAZIDE 25 MG/1
12.5 TABLET ORAL DAILY
Status: DISCONTINUED | OUTPATIENT
Start: 2019-07-26 | End: 2019-07-27 | Stop reason: HOSPADM

## 2019-07-26 RX ORDER — HYDROMORPHONE HYDROCHLORIDE 2 MG/1
4 TABLET ORAL
Status: DISCONTINUED | OUTPATIENT
Start: 2019-07-26 | End: 2019-07-27 | Stop reason: HOSPADM

## 2019-07-26 RX ORDER — DEXTROSE 50 % IN WATER (D50W) INTRAVENOUS SYRINGE
12.5-25 AS NEEDED
Status: DISCONTINUED | OUTPATIENT
Start: 2019-07-26 | End: 2019-07-27 | Stop reason: HOSPADM

## 2019-07-26 RX ORDER — MAGNESIUM SULFATE 100 %
4 CRYSTALS MISCELLANEOUS AS NEEDED
Status: DISCONTINUED | OUTPATIENT
Start: 2019-07-26 | End: 2019-07-27 | Stop reason: HOSPADM

## 2019-07-26 RX ORDER — KETOROLAC TROMETHAMINE 30 MG/ML
15 INJECTION, SOLUTION INTRAMUSCULAR; INTRAVENOUS EVERY 6 HOURS
Status: DISPENSED | OUTPATIENT
Start: 2019-07-26 | End: 2019-07-27

## 2019-07-26 RX ORDER — INSULIN LISPRO 100 [IU]/ML
INJECTION, SOLUTION INTRAVENOUS; SUBCUTANEOUS
Status: DISCONTINUED | OUTPATIENT
Start: 2019-07-26 | End: 2019-07-27 | Stop reason: HOSPADM

## 2019-07-26 RX ORDER — HYDROCHLOROTHIAZIDE 25 MG/1
12.5 TABLET ORAL DAILY
Status: DISCONTINUED | OUTPATIENT
Start: 2019-07-27 | End: 2019-07-26

## 2019-07-26 RX ORDER — ENOXAPARIN SODIUM 100 MG/ML
40 INJECTION SUBCUTANEOUS EVERY 24 HOURS
Status: DISCONTINUED | OUTPATIENT
Start: 2019-07-26 | End: 2019-07-26

## 2019-07-26 RX ORDER — HYDROMORPHONE HYDROCHLORIDE 2 MG/1
2-4 TABLET ORAL
Qty: 30 TAB | Refills: 0 | Status: SHIPPED | OUTPATIENT
Start: 2019-07-26 | End: 2019-07-29

## 2019-07-26 RX ORDER — INSULIN GLARGINE 100 [IU]/ML
50 INJECTION, SOLUTION SUBCUTANEOUS
Qty: 1 VIAL | Refills: 5 | Status: SHIPPED | OUTPATIENT
Start: 2019-07-26 | End: 2020-04-17

## 2019-07-26 RX ADMIN — ACETAMINOPHEN 1000 MG: 10 INJECTION, SOLUTION INTRAVENOUS at 06:07

## 2019-07-26 RX ADMIN — SODIUM CHLORIDE, SODIUM LACTATE, POTASSIUM CHLORIDE, AND CALCIUM CHLORIDE 125 ML/HR: 600; 310; 30; 20 INJECTION, SOLUTION INTRAVENOUS at 18:57

## 2019-07-26 RX ADMIN — PANTOPRAZOLE SODIUM 40 MG: 40 TABLET, DELAYED RELEASE ORAL at 06:30

## 2019-07-26 RX ADMIN — Medication 10 ML: at 20:43

## 2019-07-26 RX ADMIN — Medication 10 ML: at 21:32

## 2019-07-26 RX ADMIN — SODIUM CHLORIDE, SODIUM LACTATE, POTASSIUM CHLORIDE, AND CALCIUM CHLORIDE 125 ML/HR: 600; 310; 30; 20 INJECTION, SOLUTION INTRAVENOUS at 10:24

## 2019-07-26 RX ADMIN — ACETAMINOPHEN 1000 MG: 10 INJECTION, SOLUTION INTRAVENOUS at 12:08

## 2019-07-26 RX ADMIN — OXYCODONE HYDROCHLORIDE 5 MG: 5 TABLET ORAL at 06:27

## 2019-07-26 RX ADMIN — KETOROLAC TROMETHAMINE 15 MG: 30 INJECTION, SOLUTION INTRAMUSCULAR at 15:16

## 2019-07-26 RX ADMIN — INSULIN LISPRO 3 UNITS: 100 INJECTION, SOLUTION INTRAVENOUS; SUBCUTANEOUS at 16:30

## 2019-07-26 RX ADMIN — KETOROLAC TROMETHAMINE 15 MG: 30 INJECTION, SOLUTION INTRAMUSCULAR at 20:42

## 2019-07-26 RX ADMIN — HYDROCHLOROTHIAZIDE 12.5 MG: 25 TABLET ORAL at 16:01

## 2019-07-26 RX ADMIN — GABAPENTIN 200 MG: 100 CAPSULE ORAL at 18:10

## 2019-07-26 RX ADMIN — Medication 10 ML: at 15:16

## 2019-07-26 RX ADMIN — GABAPENTIN 200 MG: 100 CAPSULE ORAL at 10:27

## 2019-07-26 NOTE — OP NOTES
[address]   OPERATIVE REPORT    Name:  Jenniffer Lowe  MR#:  [id number]  :  1960  ACCOUNT #:  [de-identified]  DATE OF SERVICE:  2019    PREOPERATIVE DIAGNOSIS:  Severe obesity with comorbidity. POSTOPERATIVE DIAGNOSIS:  Severe obesity with comorbidity. PROCEDURE PERFORMED:  Laparoscopic Yulissa-en-Y gastric bypass with esophagogastroduodenoscopy. SURGEON:  Archana Villeda MD    SURGICAL ASSISTANT:  SHERRY Oshea    ANESTHESIA:  General.    COMPLICATIONS:  None. SPECIMENS REMOVED:  Anastomotic rings x2 and portion of small bowel. IMPLANTS:  None. ESTIMATED BLOOD LOSS:  10 mL. FINDINGS:  Normal gastric bypass, 150-cm Yulissa limb, antecolic, antegastric; normal postoperative EGD; negative leak test.    INDICATIONS FOR OPERATION:  The patient is a 72-year-old male with a history of severe obesity with a BMI of 39 with bariatric comorbidities including hypertension, diabetes, coronary artery disease, hyperlipidemia, who has been preoperatively approved for bariatric surgery. He has been through the necessary preoperative education and is ready for gastric bypass. My assistant, Jerry Leung, was necessary for the operation due to the complex nature of the bariatric operation. She was necessary for operation of the camera, retraction, intraoperative decision-making, and firing of the stapler for the anastomosis. DESCRIPTION OF OPERATION:  The patient was met in the preop holding area. The H and P was updated. Consent was signed. All risks and benefits were explained to the patient prior to start of the operation. He was taken back to the operating room. He was lying in a supine position. The abdomen was prepped and draped in a standard sterile fashion. Time-out was called. Antibiotics were given. SCDs were on lower extremities.   I started the operation by making a 5-mm incision into the left upper quadrant inserting a VisiPort trocar into the intraabdominal cavity, insufflating to 15 mmHg. We then placed a 5-mm trocar superior to the left of the umbilicus, a 52-UF port superior into the right of the umbilicus, a 5-mm right upper quadrant port, and a 15-mm left lateral abdominal port. We then placed a subxiphoid liver retractor into position lifting the liver up and out of the way. Stomach had a normal appearance. There was a very small hiatal hernia with a small dimple but not needing repair. The stomach otherwise looked normal.  We then started dividing the omentum over top of the transverse colon clearing a path for our Yulissa limb to go over the colon antecolic. We then pushed the transverse colon up and out of the way and found the ligament of Treitz. Once we found that, we then measured about 50 cm distal to the ligament of Treitz and divided the small intestine creating our biliopancreatic limb and our Yulissa limb. We then divided the mesentery vertically down to the base of the mesentery with the Sonicision device. We then measured 150 cm distally on our Yulissa limb and then put our biliopancreatic limb to our Yulissa limb and then created a side-to-side stapled anastomosis with a tan load Endo-ALBERTO stapler. We closed the common enterotomy with a running 2-0 V-Loc suture in a running fashion and a double layer fashion. The enterotomy was closed. We then closed the JJ mesenteric defect with a 2-0 nonabsorbable V-Loc suture. The JJ anastomosis was completed. We then had the patient placed in a steep reverse Trendelenburg position and then readjusted our liver retractor and then took down the angle of His with the Sonicision device and blunt dissection. We then dissected in along the perigastric area along the lesser curve around the area of the second gastric vessel. We had some little bit of bleeding there in that area which we got hemostatic with the Sonicision device.   We then dissected perigastric underneath the stomach into the lesser sac and then used a 60-mm purple load with TRS reinforcement of the Signia stapler to create a horizontal portion of our gastric pouch. There was nothing in the stomach at the time and then we fired our stapler. We started creating the vertical portion of our pouch using three more 60-mm loads with TRS reinforcement to create the rest of the pouch. To complete the pouch, we used a 45-mm stapler with a reinforcement strip as well. We then had created the gastric pouch. Our staple line was hemostatic. There was no bleeding. We then placed our wound protector device in through the 15-mm port fascial defect into the abdominal cavity into the wound. We then had the 25-mm EEA Baldo anvil placed down the mouth into the esophagus and down into the anterior portion of the gastric pouch and made a small gastrotomy, pulled the tubing through the gastrotomy and pulled it out through our 15-mm port site. We disconnected that from the anvil. Once the anvil was in the correct position and passed it off the field, our anvil was sitting in the anterior portion of the gastric pouch. We then brought our Yulissa limb into the upper abdomen and found a suitable area that would be good for the anastomosis and then made a small enterotomy into the Yulissa limb distally near the staple line and then brought the 25-mm EEA stapler in through the abdominal wall and then into the Yulissa limb. We brought that down distally a few centimeters down into the small bowel and once we had done that,  we fired the spike through the Yulissa limb and then mated the spike with the anvil on the stapler and then fired our stapler to create our anastomosis with the EEA stapler. The stapler fired correctly. We then removed the stapler, passed it off the field and inspected our donuts which were intact.   We then divided the mesentery of the candy cane of the Yulissa limb past the enterotomy and then divided that and sealing it off with a 60-mm tan load Endo-ALBERTO stapler. We then removed our partial small bowel resection specimen into an Endobag and removing that from the operative field. We then inspected our staple line. We placed a few hemoclips on the end of the staple line and then oversewed the anterior portion of the [address] anastomosis with a 3-0 Vicryl suture in an interrupted fashion using 3 sutures and then sutured the Yulissa limb to the antrum of the stomach to prevent any twisting of the Yulissa limb. We then performed our endoscopy with placing the scope down the mouth into the esophagus and down into the gastric pouch. Gastric pouch was created perfectly. There was no bleeding. Our staple line was intact. There was no leak during our leak test.  We were able to get the scope down into the Yulissa limb without any difficulties. We then desufflated the stomach and Yulissa limb and then passed our scope off the field. We then closed our retro-Yulissa Spears's space defect with a running 2-0 nonabsorbable V-Loc suture. With that closed, we then placed a 19-Georgian round Hugh drain into the upper abdomen along the area of the [address] anastomosis in the left upper quadrant and removed our subxiphoid liver retractor and closed our 15-mm port fascial defect with an Endo Close suture passing device in an interrupted figure-of-eight fashion. We then desufflated the abdominal cavity, removed the trocars and closed the skin with 4-0 Monocryl and Dermabond to complete the operation. Dr. Alberto Armendariz was present and scrubbed during the entire operation. The counts were correct.       Juliana Joel MD      [id number]  D:  07/25/2019 14:45  T:  07/25/2019 14:54  JOB #:  [id number]

## 2019-07-26 NOTE — PROGRESS NOTES
This RN spoke with Lai Javier in Pharmacy regarding the patients pork deprived allergy which results in severe swelling. This RN and Lai Javier agreed that heparin and lovenox were not an option. Lai Javier verbalized that she would notify Ulices Marsh (in Pharmacy) to contact the Physician to address what DVT prophylaxis should be ordered.

## 2019-07-26 NOTE — PROGRESS NOTES
NUTRITION     Chart reviewed. Post-op bariatric diet instruction completed. Will gladly follow up for additional questions as needed. Thank you.      Jcakie Fierro RD

## 2019-07-26 NOTE — DISCHARGE INSTRUCTIONS
Laparoscopic Yulissa en Y Gastric Bypass    You have a 2 week follow up appointment    Future Appointments   Date Time Provider Shakir Trevizo   2019  9:00 AM ИРИНА Newman SCHED   2019  9:20 AM ИРИНА Newman SCHED   2019  9:00 AM ИРИНА Newman           Patient Discharge Instructions    Jelain Soto / [id number] : 1960    Admitted 2019 Discharged:        · It is important that you take the medication exactly as they are prescribed. · Keep your medication in the bottles provided by the pharmacist and keep a list of the medication names, dosages, and times to be taken in your wallet. · Do not take other medications without consulting your doctor. · Wound care - you may shower starting tomorrow. Do not remove the dermabond covering the incision, it will fall of on its own in a few weeks. What to do at Home    See \"Gastric Bypass Discharge Instructions\" below. Information obtained by :  I understand that if any problems occur once I am at home I am to contact my physician. I understand and acknowledge receipt of the instructions indicated above. Physician's or R.N.'s Signature                                                                  Date/Time                                                                                                                                              Patient or Representative Signature                                                          Date/Time       Gastric Bypass  Patient Discharge Instructions  General Surgery at [address] [place] [address]  [phone]    1. Activities: You may be active walking, stair climbing, and doing light weight activities with one to two-pound weights, sitting in a chair using different arm motions.  Major restrictions include driving until your first office visit and lifting anything heavier than ten pounds. It is very important that you walk frequently. In addition to walking, you should continue to use your incentive spirometer (breathing exercises) throughout the day. 2. Caring for vour incision (s}: Your surgical incision(s) will be covered with Derma bond (super glue). You may shower as desired and simply pat dry the area over the incision(s). There may occasionally be seepage of yellowish to yellowish-maroon dissolved fat from your wound, which is of no major concern as long as the wound is not red, hardened in the area of the drainage, or if the drainage has a foul smell. If there are any questions, call our office for further instructions. If there is this type of dissolved fat drainage,  the shower and gently express the fluid from your wound. Then keep gauze pads over the area to protect both the wound and your clothes. 3. When to call the office immediately:      *Chest pain (not associated with eating/drinking)  *Shortness of breath (more than normal)  *Sudden pain and/or redness in calf  *Fever greater than 101F  *Persistent nausea and/or vomiting (unable to keep down any liquids)  *Bleeding from incision(s)  *Severe abdominal pain  *Any other concerning symptoms    4. Diet: There are three main priorities as far as your diet is concerned---    a. Clear Liquids-drink from 1 oz. cups or standard shot glass. These liquids are non-carbonated, no added sugar, and not irritating to your stomach. They may include water, tea, coffee\" 100% no added sugar juices (avoid citrus) , clear broth, blenderized clear soups such as [employer]' Healthy Request Chicken Noodle and Chicken & Rice, Sugar-free products including popsicles, Ant-Aid, and Crystal Lite. b. Vitamins: Multi-vitamin with iron in the morning and evening, making sure  it's not the first thing or the last thing taken.  The other Vitamin B-12 and  vitamin A & D capsule may be taken once during the day anytime that you  can make a routine of it.    c. Protein Intake: During your initial two-three weeks when your body is switching from burning glucose to directly burning fat, there is an attempt by your body to use protein as a fuel. To protect that protein, we like you to exercise as listed above, and to try to take in 50-60 grams of protein per day. This can be done with four 8 oz. serving of skim milk and Low Carb [place] Instant Breakfast. Each 8 oz. should be considered a meal-breakfast, lunch, or supper, and during this mealtime it should be the sole ingested substance. Stop your clear liquids for 20 minutes before your start on the instant breakfast, which is sipped 1 oz. at a time. You may also try the following substitute for [place] Instant Breakfast: skim milk-fat free powdered milk + Egg Beaters + flavor extract. If desired, ice may be added and blenderized in the . If the milk upsets your stomach, you may purchase Lactaid tablets from any drug store. Lactaid skim milk may be purchased at most major supermarkets. Another alternative is Boost Diabetic, which is Lactose-free and ready to drink. There are many protein supplements on the market. Whey and Soy based protein powders/drinks are acceptable. If you have any questions about specific products please call the office. d. Other foods may be taken if you have met the requirements of a, b, and c. These foods should be low fat, low sugar, and low spice, and blenderized to the point that, if needed, could be sucked through a straw. One of the favorite treats is dietetic canned fruit blenderized with a combination of its juices and crushed ice, and then eaten in small amounts with a spoon. A smooth low-calorie, low-fat yogurt (should be 100 calories or less) is acceptable as is low-fat or fat-free cottage cheese.      If you are having difficulty with your diet, please call the office. 5. Medications: Take no more than 2 pills at a time. Wait 20 minutes between pills. a. Vitamins as listed above---multi-vitamin with iron twice a day, B-12 once a day, vitamin A& D once a day. b. Acid reducing medicine (e.g. Prilosec, Prevacid)--Will be prescribed by your surgeon at discharge. c. Mylanta Plus--one to two teaspoons as needed for belching or gas (other gas relieving medicines are also acceptable Beano, GasX, etc). d. Bowel Regulation--mild laxatives are permissible such as Milk of Magnesia, Dulcolax (either by mouth or suppository). If you are accustomed to using a [id number] laxative not mentioned, you may continue to use it. Fibercon tablets or Benefiber may be taken as a fiber supplement to regulate bowel movements. Fibercon tablets should be broken in half and taken in half and taken one half in the morning and one half in the evening. Benefiber (1 tsp.) can be added to your protein drink(s). It has no taste or added thickness. Imodium AD may be taken as needed for diarrhea.    e. Pain medication-one to two every four hours as needed for pain control. If pain is mild, try extra-strength Tylenol first.    f. Do not take any pain or arthritis medication such as Aspirin, Advil, Aleve, Naprosyn, or any other nonsteroidal anti-inflammatory medication unless approved by your surgeon. A Tylenol product is OK.    g. Preadmission medications may be resumed, but this should be discussed without your doctor before your discharge from the hospital.    6.  Follow up Office Visits:  call our office at [phone] to make an appointment. Call for the appointment when you are discharged. Your appointment should be 2 weeks from your surgery date. Do not plan on returning to work prior to this office visit unless discussed with your doctor.         MD Muriel Chan, CALEB Watt, [address] [facility]

## 2019-07-26 NOTE — PROGRESS NOTES
TRANSITIONS OF CARE PLAN:   1. DESTINATION: Own Home  2. TRANSPORT: Spouse  3. ADDITIONAL SUPPORT: Extensive Family support  4. DME: Glucometer  5. HOME HEALTH: None  6. CODE STATUS/AMD STATUS: NOT ON FILE  7. FOLLOW UP APPOINTMENTS: Attending  8. STILL NEEDS: Progression through bariatric surgery post-op goals    Reason for Admission:   Severe Obesity                   RRAT Score:          4           Plan for utilizing home health:      None                    Current Advanced Directive/Advance Care Plan: NOT ON FILE                         Transition of Care Plan:                  Patient has no hx of HH, IPR, or SNF. [facility] preference [facility]. Patient is allergic to pork, so patient will not receive lovenox or heparin. Patient's spouse is a [employer] nurse and sister is a PA. Patient has excellent home support. Disposition includes transport to own home via spouse and additional home support from family. No additional CM needs noted. Patient is anticipated to discharge 7/27. Care Management Interventions  PCP Verified by CM: Yes(last seen by Dr. oJanna Gregg 1 month ago)  Palliative Care Criteria Met (RRAT>21 & CHF Dx)?: No  Mode of Transport at Discharge:  Other (see comment)(spouse to transport)  Transition of Care Consult (CM Consult): Discharge Planning  MyChart Signup: Yes  Discharge Durable Medical Equipment: No(has dme of glucometer)  Health Maintenance Reviewed: Yes(cm met with patient, with spouse at bedside, with patient alert and oriented x 4)  Physical Therapy Consult: No  Occupational Therapy Consult: No  Speech Therapy Consult: No  Current Support Network: Lives with Spouse, Own Home, Family Lives Nearby  Confirm Follow Up Transport: Self(independent in adls to include driving)  Plan discussed with Pt/Family/Caregiver: Yes(spouse at bedside)  [employer] Provided?: No  Discharge Location  Discharge Placement: Home with family assistance(lives with spouse, adult daughter, and 2 infant grandchildren in 2 story home, 1 exterior step, 2nd floor bedroom, 14 interior steps)

## 2019-07-26 NOTE — PROGRESS NOTES
Discontinue lovenox order per telephone read back from Dr. Evi Baumann. Patient allergic to pork derivatives.

## 2019-07-26 NOTE — PROGRESS NOTES
Bedside shift change report given to Vanita Evangelista (oncoming nurse) by Bushra Finley (offgoing nurse). Report included the following information SBAR.

## 2019-07-26 NOTE — PROGRESS NOTES
[facility] General Surgery    POD #1    Subjective     DOing well, tolerating ice and sips, no N/V, pain mostly controlled, OOB    Objective     Patient Vitals for the past 24 hrs:   Temp Pulse Resp BP SpO2   07/26/19 0306 98.7 °F (37.1 °C) 88  153/81 96 %   07/25/19 2305 98.5 °F (36.9 °C) 80 18 144/68 95 %   07/25/19 1918 97.9 °F (36.6 °C) 84 16 158/90 98 %   07/25/19 1815 97.9 °F (36.6 °C) 83 16 145/89 94 %   07/25/19 1714 97.8 °F (36.6 °C) 83 16 (!) 165/99 93 %   07/25/19 1615 97.8 °F (36.6 °C) 80 16 154/87 93 %   07/25/19 1530 97.8 °F (36.6 °C) 79 23 154/88 95 %   07/25/19 1515  72 19 147/81 97 %   07/25/19 1510  81 28 154/87 93 %   07/25/19 1505  84 19 143/86 93 %   07/25/19 1500 97.6 °F (36.4 °C) 87 12 147/89 95 %   07/25/19 0951 97.7 °F (36.5 °C) (!) 53 18 142/77 98 %       Date 07/25/19 0700 - 07/26/19 0659 07/26/19 0700 - 07/27/19 0659   Shift 5860-6547 6980-6067 24 Hour Total 3757-4304 9365-5467 24 Hour Total   INTAKE   P.O.  100 100        P. O.  100 100      I. V.(mL/kg/hr) 1500(1.1) 825(0.6) 2325(0.8)        Volume (lactated Ringers infusion)  725 725        Volume (lactated Ringers infusion) 1400  1400        Volume (acetaminophen (OFIRMEV) infusion 1,000 mg) 100 100 200      Shift Total(mL/kg) 1500(12.9) 925(7.9) 2425(20.8)      OUTPUT   Urine(mL/kg/hr)  1350(1) 1350(0.5)        Urine Voided  1350 1350      Drains 15 80 95        Output (ml) (Hugh Drain #1 07/25/19 Left Abdomen) 15 80 95      Blood 25  25        Estimated Blood Loss 25  25      Shift Total(mL/kg) 40(0.3) 1430(12.3) 1470(12.6)      NET 1460 -505 955      Weight (kg) 116.7 116.7 116.7 116.7 116.7 116.7       PE  Pulm - CTAB  CV - RRR  Abd - soft, ND, BS present, incisions c/d/i, agustin SS    Labs  Recent Results (from the past 12 hour(s))   GLUCOSE, POC    Collection Time: 07/25/19  9:14 PM   Result Value Ref Range    Glucose (POC) 160 (H) 65 - 100 mg/dL    Performed by Jose Luis Bran METABOLIC PANEL, BASIC    Collection Time: 07/26/19  3:20 AM   Result Value Ref Range    Sodium 140 136 - 145 mmol/L    Potassium 4.5 3.5 - 5.1 mmol/L    Chloride 106 97 - 108 mmol/L    CO2 26 21 - 32 mmol/L    Anion gap 8 5 - 15 mmol/L    Glucose 187 (H) 65 - 100 mg/dL    BUN 18 6 - 20 MG/DL    Creatinine 1.47 (H) 0.70 - 1.30 MG/DL    BUN/Creatinine ratio 12 12 - 20      GFR est AA 60 (L) >60 ml/min/1.73m2    GFR est non-AA 49 (L) >60 ml/min/1.73m2    Calcium 8.6 8.5 - 10.1 MG/DL   CBC W/O DIFF    Collection Time: 07/26/19  3:20 AM   Result Value Ref Range    WBC 12.3 (H) 4.1 - 11.1 K/uL    RBC 5.66 4.10 - 5.70 M/uL    HGB 12.7 12.1 - 17.0 g/dL    HCT 43.2 36.6 - 50.3 %    MCV 76.3 (L) 80.0 - 99.0 FL    MCH 22.4 (L) 26.0 - 34.0 PG    MCHC 29.4 (L) 30.0 - 36.5 g/dL    RDW 14.6 (H) 11.5 - 14.5 %    PLATELET 209 820 - 382 K/uL    MPV 11.3 8.9 - 12.9 FL    NRBC 0.0 0  WBC    ABSOLUTE NRBC 0.00 0.00 - 0.01 K/uL         Assessment     Josselin GUAMAN Tharon Cranker is a 64 y. o.yr old male s/p gastric bypass    Plan     Doing well post op  OOB more today  Continue IVF  lovenox SQ today  toradol added to pain medication  Starting bariatric liquid diet today    Briseida Pandya MD

## 2019-07-27 VITALS
DIASTOLIC BLOOD PRESSURE: 74 MMHG | TEMPERATURE: 97 F | RESPIRATION RATE: 18 BRPM | HEART RATE: 62 BPM | SYSTOLIC BLOOD PRESSURE: 148 MMHG | BODY MASS INDEX: 38.11 KG/M2 | OXYGEN SATURATION: 96 % | WEIGHT: 257.31 LBS | HEIGHT: 69 IN

## 2019-07-27 LAB
GLUCOSE BLD STRIP.AUTO-MCNC: 130 MG/DL (ref 65–100)
GLUCOSE BLD STRIP.AUTO-MCNC: 148 MG/DL (ref 65–100)
SERVICE CMNT-IMP: ABNORMAL
SERVICE CMNT-IMP: ABNORMAL

## 2019-07-27 PROCEDURE — 74011250636 HC RX REV CODE- 250/636: Performed by: SURGERY

## 2019-07-27 PROCEDURE — 74011250637 HC RX REV CODE- 250/637: Performed by: NURSE PRACTITIONER

## 2019-07-27 PROCEDURE — 74011250637 HC RX REV CODE- 250/637: Performed by: SURGERY

## 2019-07-27 PROCEDURE — 82962 GLUCOSE BLOOD TEST: CPT

## 2019-07-27 PROCEDURE — 74011636637 HC RX REV CODE- 636/637: Performed by: SURGERY

## 2019-07-27 RX ADMIN — HYDROCHLOROTHIAZIDE 12.5 MG: 25 TABLET ORAL at 08:46

## 2019-07-27 RX ADMIN — INSULIN LISPRO 3 UNITS: 100 INJECTION, SOLUTION INTRAVENOUS; SUBCUTANEOUS at 07:30

## 2019-07-27 RX ADMIN — Medication 10 ML: at 07:45

## 2019-07-27 RX ADMIN — PANTOPRAZOLE SODIUM 40 MG: 40 TABLET, DELAYED RELEASE ORAL at 07:45

## 2019-07-27 RX ADMIN — KETOROLAC TROMETHAMINE 15 MG: 30 INJECTION, SOLUTION INTRAMUSCULAR at 02:38

## 2019-07-27 RX ADMIN — SODIUM CHLORIDE, SODIUM LACTATE, POTASSIUM CHLORIDE, AND CALCIUM CHLORIDE 125 ML/HR: 600; 310; 30; 20 INJECTION, SOLUTION INTRAVENOUS at 10:42

## 2019-07-27 RX ADMIN — GABAPENTIN 200 MG: 100 CAPSULE ORAL at 08:46

## 2019-07-27 NOTE — PROGRESS NOTES
Patient given instructions to keep all follow up appointments, when to return for worsening symptoms, IV discontinued, VICENTA drain discontinued, dressing intact, glucose 130, & the patient was discharged via wheelchair with all belongings.

## 2019-07-27 NOTE — PROGRESS NOTES
Progress Note    Patient: Sandrine Dotson MRN: [id number]  SSN: [id number]    YOB: 1960  Age: 64 y.o. Sex: male      Admit Date: 2019    2 Days Post-Op    Procedure:  Procedure(s):  LAPAROSCOPIC GASTRIC BYPASS  (E R A S)  ESOPHAGOGASTRODUODENOSCOPY (EGD)    Subjective:     No acute surgical issues. Pt tolerating bariatric liquids without nausea or vomiting. Pain is under control. No fevers or chills. Passing flatus.     Objective:     Visit Vitals  /74 (BP 1 Location: Right arm, BP Patient Position: Sitting)   Pulse 62   Temp 97 °F (36.1 °C)   Resp 18   Ht 5' 9\" (1.753 m)   Wt 257 lb 5 oz (116.7 kg)   SpO2 96%   BMI 38.00 kg/m²       Temp (24hrs), Av.5 °F (36.9 °C), Min:97 °F (36.1 °C), Max:99.2 °F (37.3 °C)        Physical Exam:    Gen:  NAD  Pulm:  Unlabored  Abd:  S/ND/appropriate TTP  Wound:  C/D/I  VICENTA with serosanguinous drainage    Recent Results (from the past 24 hour(s))   GLUCOSE, POC    Collection Time: 19  5:18 PM   Result Value Ref Range    Glucose (POC) 163 (H) 65 - 100 mg/dL    Performed by Luciana Hillman    GLUCOSE, POC    Collection Time: 19  9:26 PM   Result Value Ref Range    Glucose (POC) 158 (H) 65 - 100 mg/dL    Performed by Delmis Segura, POC    Collection Time: 19  6:37 AM   Result Value Ref Range    Glucose (POC) 148 (H) 65 - 100 mg/dL    Performed by Suzanne Contreras:     Hospital Problems  Date Reviewed: 2019          Codes Class Noted POA    Severe obesity (BMI 35.0-35.9 with comorbidity) ([facility] ICD-10-CM: E66.01, Z68.35  ICD-9-CM: 278.01, V85.35  2019 Unknown              Plan/Recommendations/Medical Decision Making:     - Bariatric liquids  - Out of bed as tolerated  - DC VICENTA drain  - Plan DC home this am

## 2019-07-27 NOTE — PROGRESS NOTES
Bedside and Verbal shift change report given to Cyndie Raya RN (oncoming nurse) by Quinton Teran RN (offgoing nurse). Report included the following information SBAR, Kardex, ED Summary, Intake/Output, MAR and Recent Results.

## 2019-07-29 ENCOUNTER — TELEPHONE (OUTPATIENT)
Dept: SURGERY | Age: 59
End: 2019-07-29

## 2019-07-29 NOTE — TELEPHONE ENCOUNTER
Bariatric Post-Operative Phone Calls: 48 hour phone call    Diet:Question of any nausea and/or vomiting. Protein intake (goal is 60 grams of protein daily)   Poor____Fair__x__Good____Great____     Comment:___feels like he needs to belch but cannot. I informed to drink less at a time. ___________________________________________________________      ______________________________________________________________________    Hydration:Less than 32 ounces of water daily is fair to poor (Goal is 64 ounces per day)   Poor____ Fair____ Good____Great____    Comment:___sips all day ___________________________________________________________    ______________________________________________________________________      Ambulation:( walking at least 3 x week, for 15- 20 minutes)     Poor______ Fair__x____ Good______     Great______ Comment:__________________________________________________    ______________________________________________________________________      Urine Color: Question of any odor and color(should be radha, pale, and clear) Dark______ Amber___x___ Pale______      Clear______ Comment:_____informed to drink more water ______________________________________________                           ________________________________________________________________    Bowel movements: Question of any constipation- haven't had any bowel movements for more than 3 days. This could be related to protein intake and/or narcotic pain medication usage. Comment:                                                            Is taking miralax but no BM yet                                                                  Pain: Left sided abdominal pain is normal (should be less than 3)  Question if pain medication is helpful.  10___ 9___ 8___ 7_x__ 6___ 5___ 4___ 3___ 2___1___0___Comment:_________________________________________________    ______________________________________________________________________      Incision: (No redness, pain, swelling or fever) Healing Well___x___     Healed______Redness_________ Pain_________     Swelling_________ Fever__________(greater than 101 needs evaluation)    Comment:___incisions blead a little but stopped now _________________________________________________________    ______________________________________________________________________  Use of incentive spirometer: Yes_x___       No           Next Appointment:____  8/8/2019 9:00 AM Patriciaann Polio, NP     __________                 Support Group: Yes___x___No______    Additional Comments:_____youtube video's and will join a SF later_______________________________________________________    ____________________________________________________________________      If more than one parameter is not met or considered poor, nurse needs to discuss with provider recommend for patient to be seen in the office as soon as possible or refer to the provider for follow-up. Reinforce to patient to use bariatric educational booklet as guide. It is appropriate to refer patient to the nutritionist to discuss more in detail of diet and nutrition.

## 2019-08-06 NOTE — DISCHARGE SUMMARY
Physician Discharge Summary     Patient ID:  Gwendolyn Mo  [id number]  11 y.o.  1960    Admit Date: 7/25/2019    Discharge Date: 7/27/2019    Admission Diagnoses: Severe obesity (BMI 35.0-35.9 with comorbidity) ([facility] [E66.01, Z68.35]    Discharge Diagnoses: Active Problems:    Severe obesity (BMI 35.0-35.9 with comorbidity) ([facility] (7/25/2019)         Admission Condition: Good    Discharge Condition: Good    Procedure(s):    7/25/2019 - Procedure(s):  LAPAROSCOPIC GASTRIC BYPASS  (E R A S)  ESOPHAGOGASTRODUODENOSCOPY (EGD)      Hospital Course:   Normal hospital course for this procedure. He did well after surgery and was started on a liquid diet on POD 1. He tolerated diet well and was DC home on POD 2    Consults: None    Significant Diagnostic Studies: none    Disposition: home    Patient Instructions:   Cannot display discharge medications since this patient is not currently admitted.       Activity: No heavy lifting for 2 weeks  Diet: bariatric liquid diet  Wound Care: Keep wound clean and dry    Follow-up with Filemon Montes MD in 2 week(s)  Follow-up tests/labs none    Signed:  Filemon Montes MD  8/6/2019  2:40 PM

## 2019-08-08 ENCOUNTER — OFFICE VISIT (OUTPATIENT)
Dept: SURGERY | Age: 59
End: 2019-08-08

## 2019-08-08 VITALS
HEART RATE: 52 BPM | WEIGHT: 243 LBS | DIASTOLIC BLOOD PRESSURE: 73 MMHG | BODY MASS INDEX: 35.99 KG/M2 | OXYGEN SATURATION: 98 % | RESPIRATION RATE: 18 BRPM | TEMPERATURE: 98.6 F | SYSTOLIC BLOOD PRESSURE: 119 MMHG | HEIGHT: 69 IN

## 2019-08-08 DIAGNOSIS — Z09 FOLLOW-UP EXAMINATION AFTER ABDOMINAL SURGERY: Primary | ICD-10-CM

## 2019-08-08 DIAGNOSIS — E66.9 OBESITY (BMI 30-39.9): ICD-10-CM

## 2019-08-08 DIAGNOSIS — K91.2 POSTOPERATIVE INTESTINAL MALABSORPTION: ICD-10-CM

## 2019-08-08 RX ORDER — TADALAFIL 5 MG/1
TABLET ORAL
COMMUNITY
End: 2020-01-17

## 2019-08-08 RX ORDER — CHOLECALCIFEROL (VITAMIN D3) 125 MCG
CAPSULE ORAL
COMMUNITY

## 2019-08-08 NOTE — PROGRESS NOTES
1. Have you been to the ER, urgent care clinic since your last visit? Hospitalized since your last visit? No    2. Have you seen or consulted any other health care providers outside of the [address] [place] [address] since your last visit? Include any pap smears or colon screening.  No

## 2019-08-08 NOTE — PROGRESS NOTES
Chief Complaint   Patient presents with    Surgical Follow-up     2wks s/p gastric bypass, down 12lbs       Josselin Leon is 2 weeks status post Malabsorptive gastric bypass for treatment of morbid obesity . Presents today for obesity management. Patient has lost 12 lbs. Since surgery. Patient is satisfied with progress. Patient is consuming 90 grams of protein daily. 3 Premiere shakes   Patient is drinking 64+ oz of fluids per day. Water   Bowels moving slow but every other day or so     Constipated  prn  Using laxatives miralax every day   Nausea  no  Regurgitation  no  No fever or chills, chest pain or shortness of breath. Vitamin compliance MVI and hasn't gotten the other vitamins   Activity  Biking 8 miles every day   Sleep   Good   Blood sugars 170 or less and adjusting lantus   Minimal pain left abdomen   He returned to work at 1 week post op     Physical Exam  Visit Vitals  /73 (BP 1 Location: Left arm, BP Patient Position: Sitting)   Pulse (!) 52   Resp 18   Ht 5' 9\" (1.753 m)   Wt 243 lb (110.2 kg)   SpO2 98%   BMI 35.88 kg/m²     A + O x 3  Chest  CTA, unlabored   COR  RRR  ABD Soft, obese, lap sites C/D/I ; NT/ND, no masses or hernias   EXT No edema; ambulating independently       ICD-10-CM ICD-9-CM    1. Follow-up examination after abdominal surgery Z09 V67.09    2. Obesity (BMI 30-39. 9) E66.9 278.00    3. Postoperative intestinal malabsorption K91.2 579.3        Josselin Leon is 2 weeks s/p Malabsorptive gastric bypass for treatment of morbid obesity  doing well   Diet soft stage I   Reminded of restrictions   Continue vitamins and protein supplements   Activity daily walking 10 minutes every hour   Sleep hygiene reviewed   Follow-up in 2 weeks   Return to work he returned last Wednesday on his own  Support group  [address] verbalized understanding and questions were answered to the best of my knowledge and ability.     Diet, activity and mindfulness educational materials were provided. 15 minutes spent in face to face with Josselin Mcintyre > 50% counseling.

## 2019-08-08 NOTE — PATIENT INSTRUCTIONS
Vitamins:    [place] complete twice daily   Add B12 500 mcg every day   Calcium citrate (Citrical petites) 2 twice daily (not with the multi)     Continue to use your protein shakes to meet your daily goals     What you need to know:  1. Advance your diet to soft foods. Follow the handout that you were given today in the office. 2.  Take the recommended vitamins daily  3. No lifting greater than 20 lbs. 4.  You can do light jogging and walking. 5  Follow up in 2 weeks. 6.  You may go into a pool. 7.  If you are not able to tolerate liquids or soft foods. Please call our office. [phone]  8. If you have vomiting and persistent epigastric pain or chest pain. You should call our office, the doctor on-call or go to the emergency room. What to do if you are constipated: You may  take Milk of Magnesia. Take 2 Tablespoons followed by 16 oz of water then 2 hours later take another 2 tablespoons. If  milk of magnesia does not work then take [place] or Miralax over the counter. Keep in mind that the Benefiber or Miralax may take a day or two to work. If all of the above do not work try a Fleets enema and follow the directions on the box. Soft and Mushy: Phase 1    Below is a list of basic items to purchase for the first phase of the   soft mushy diet. Your surgeon or nurse practitioner will inform you when it is okay   to advance to the next phase. Soft and Mushy Foods: Prepare food to the appropriate texture. ? Everything on clear and full liquid diet  ? Applesauce (no sugar added)  ? Hot & cold cereals (Cream of Wheat, Plain Cheerios®, Special K with protein®, plain oatmeal, grits)  ? Frozen or canned vegetables (carrots, acorn squash, butternut squash, string beans, spinach, broccoli, cauliflower - florets only!)   ? Canned fruit (in natural juice or with Splenda®)  ? Fat-free, cholesterol-free egg substitute (P)  ? Low-fat or fat-free cottage cheese (P)  ?  Low-fat or fat-free yogurt (P)  ? Low-fat or fat-free [place] yogurt (P)  ? Fat-free milk or 1% milk (P)  ? Lactaid fat-free or 1% low fat milk (P)  ? Low-fat well-cooked/soft beans (the consistency of refried beans) (P)  ? No sugar added, low fat pudding (no pistachio or other flavor containing nuts)  ? low-fat cream soups  ? Low-fat chicken noodle or chicken rice soup (P)  ? Sugar-free fudgesicles  ? Sugar-free cocoa  ? Fat free whipped or mashed potatoes   ? Herbs and spices  ? Lite butter, margarine, canola oil, olive oil, reduced-fat or fat-free mayonnaise, reduced-fat or fat-free salad dressing, reduced-fat or fat-free cream cheese, reduced-fat or fat-free sour cream.        P designates food sources of protein. Include a protein at each meal.     If a food does not contain protein, you may want to consider adding protein powder to the food to give it extra protein. For example, mix protein powder in with the following: oatmeal, mashed potatoes, sugar-free pudding, sugar-free gelatin (see recipes in book), no-sugar-added applesauce. Soft Mushy Diet: Phase 1  Time Meal or Snack Soft/Mushy Food Amount (ounces) Protein  (g) Supplement   6:30 am Sip on Fluids Sip on non-carbonated, calorie-free, no sugar added liquids. 8 oz   0 g Take Multivitamin containing 18 mg ferrous sulfate (iron)    7:00 am   Stop drinking fluids 30 minutes before breakfast   7:30 am Breakfast ½ cup sugar-free oatmeal with 1 scoop of protein powder. Add cinnamon, nutmeg, artificial sweeteners as desired for flavor. 4 oz    20-25 g    9:00 Snack  (optional) High Protein Gelatin (see recipe in book) 4oz 10 g    11:30 am Stop drinking liquids 30 minutes before lunch   12:00 pm Lunch Sip low-fat cream of potato soup or low-fat cream of chicken soup mixed with 1 scoop of protein powder 8 oz soup 25 g Take 400 mg calcium citrate   2:00 Snack  (optional) ½ cup high protein pudding (see recipe in book)   or   ½ cup low-fat cottage cheese or yogurt. Can also add protein powder as needed. 4 oz 14 g    or    5 g      3:00 - 5:30 pm   Sip on Fluids   Sip on non-carbonated, calorie-free, no sugar added liquids. 24 - 32 oz   0 g   Take 400 mg calcium citrate. 6:00 pm Dinner ¼ cup low-fat, well cooked beans (ex. black beans, low-fat refried beans)  ¼ cup no-sugar-added applesauce 4 oz 3.5 g Take 400 mg of calcium citrate.    7:00 - 10:00 pm Sip on Fluids Sip on non-carbonated, no sugar added liquids as needed  16-24 oz 0 g Take Multivitamin with 18 mg ferrous sulfate   Total:  80 oz clear fluids 63-77  grams 2 Multivitamins with 18 mg ferrous sulfate, 2094-4645 mg calcium citrate

## 2019-08-15 ENCOUNTER — TELEPHONE (OUTPATIENT)
Dept: SURGERY | Age: 59
End: 2019-08-15

## 2019-08-15 DIAGNOSIS — K91.2 POSTOPERATIVE INTESTINAL MALABSORPTION: ICD-10-CM

## 2019-08-15 DIAGNOSIS — K91.2 POSTOPERATIVE INTESTINAL MALABSORPTION: Primary | ICD-10-CM

## 2019-08-15 RX ORDER — HYOSCYAMINE SULFATE 0.125 MG
125 TABLET ORAL
Qty: 30 TAB | Refills: 0 | Status: SHIPPED | OUTPATIENT
Start: 2019-08-15 | End: 2019-08-16 | Stop reason: SDUPTHER

## 2019-08-15 NOTE — TELEPHONE ENCOUNTER
Bariatric Post-Operative Phone Calls: Week 3    Diet:Question of any nausea and/or vomiting. Question of tolerance to diet advancement from liquids to solids. Protein intake (goal is 60 grams of protein daily)   Poor____Fair____Good____Great__x__     Comment:______________________________________________________________      ______________________________________________________________________    Hydration:Less than 32 ounces of water daily is fair to poor (Goal is 64 ounces per day)   Poor____ Fair____ Good____Great__x__    Comment:______________________________________________________________    ______________________________________________________________________      Ambulation:( walking at least 3 x week, for at least 30 minutes)   Poor______ Fair______ Good______     Great__x____ Comment:__________________________________________________    ______________________________________________________________________      Urine Color: Question of any odor and color(should be radha, pale, and clear) Dark______ Amber______ Pale______      Clear__x____ Comment:___________________________________________________                           ________________________________________________________________    Bowel movements: Question of any constipation- haven't had any bowel movements for more than 3 days. This could be related to protein intake and/or narcotic pain medication usage. Comment:                                                                                                                     Having bowel movements         Pain: Left sided abdominal pain is normal (should be less than 3)         Question if pain medication is helpful.  10___ 9___ 8___ 7___ 6___ 5___ 4___ 3___     2___1___0_x__Comment:_________________________________________________    ______________________________________________________________________      Incision: (No redness, pain, swelling or fever) Healing Well______ Healed___x___Redness_________ Pain_________     Swelling_________ Fever__________(greater than 101 needs evaluation)    Comment:____________________________________________________________    ______________________________________________________________________  Use of incentive spirometer: Yes____       No           Next Appointment:_8/22/19_____________                 Support Group: Yes______No__x____    Additional Comments:____________________________________________________________    ____________________________________________________________________      If more than one parameter is not met or considered poor, nurse needs to discuss with provider recommend for patient to be seen in the office as soon as possible or refer to the provider for follow-up. Reinforce to patient to use bariatric educational booklet as guide. It is appropriate to refer patient to the nutritionist to discuss more in detail of diet and nutrition.

## 2019-08-15 NOTE — TELEPHONE ENCOUNTER
----- Message from Farida Mcgee LPN sent at 8/48/8126  5:00 PM EDT -----  Regarding: 3 week FW: Discharge Phone Call    ----- Message -----  From: Rupert Vo  Sent: 7/29/2019   9:07 AM EDT  To: Darrell Bruno LPN, Lobito Vargas  Subject: Discharge Phone Call                             Hello,    Please call Mr. Mcintyre for his discharge phone call. Lobito-Please put in a reminder for his 3 week call. Thank you!   Nurys

## 2019-08-15 NOTE — TELEPHONE ENCOUNTER
Spoke with patient advised levsin has been sent to pharmacy on file, go back on liquid diet for about 48hrs and progress back to current diet.

## 2019-08-15 NOTE — TELEPHONE ENCOUNTER
----- Message from Lara Tatum NP sent at 8/15/2019  1:35 PM EDT -----  Have him regress his diet to liquids and shakes. Prescribe  Levsin 0.125 mg every 6 hours as needed for spasm. ----- Message -----  From: Dlemer Boyce  Sent: 8/15/2019  11:17 AM EDT  To: Lara Tatum NP    This patient said he is having trouble swallowing he said it feels like its just sitting in his throat. You see him on 8/22 would you like me to try and get a EGD with dilatation?

## 2019-08-16 RX ORDER — HYOSCYAMINE SULFATE 0.125 MG
125 TABLET ORAL
Qty: 30 TAB | Refills: 0 | Status: SHIPPED | OUTPATIENT
Start: 2019-08-16 | End: 2019-08-22 | Stop reason: SDUPTHER

## 2019-08-22 ENCOUNTER — OFFICE VISIT (OUTPATIENT)
Dept: SURGERY | Age: 59
End: 2019-08-22

## 2019-08-22 VITALS
WEIGHT: 243.5 LBS | HEIGHT: 69 IN | RESPIRATION RATE: 16 BRPM | TEMPERATURE: 97.6 F | OXYGEN SATURATION: 99 % | HEART RATE: 56 BPM | SYSTOLIC BLOOD PRESSURE: 110 MMHG | BODY MASS INDEX: 36.07 KG/M2 | DIASTOLIC BLOOD PRESSURE: 70 MMHG

## 2019-08-22 DIAGNOSIS — K91.2 POSTOPERATIVE INTESTINAL MALABSORPTION: ICD-10-CM

## 2019-08-22 DIAGNOSIS — E66.01 MORBID OBESITY (HCC): Primary | ICD-10-CM

## 2019-08-22 DIAGNOSIS — Z09 SURGICAL FOLLOWUP: ICD-10-CM

## 2019-08-22 RX ORDER — HYOSCYAMINE SULFATE 0.125 MG
125 TABLET ORAL
Qty: 30 TAB | Refills: 0 | Status: SHIPPED | OUTPATIENT
Start: 2019-08-22 | End: 2019-10-17

## 2019-08-22 NOTE — PROGRESS NOTES
4 weeks status post gastric bypass. Pt reports doing well on liquids and soft foods. Karen Hobby He is still feeling like foods are \"sticking\"  In his esophagus. He was unable to get the Levsin filled due to it not being at the pharmacy. Patient  Complains of pain when he sneezes at his left lateral incision. He has been drinking before meals and immediately after. He is drinking approximately 64 oz of water daily  +BM  He is drinking protein shakes. He is taking bariatric vitamins without issue. Total weight loss since surgery 11.5 lbs  Weight gain 1.5 lbs. Visit Vitals  /70 (BP 1 Location: Right arm, BP Patient Position: Sitting)   Pulse (!) 56   Temp 97.6 °F (36.4 °C) (Oral)   Resp 16   Ht 5' 9\" (1.753 m)   Wt 243 lb 8 oz (110.5 kg)   SpO2 99%   BMI 35.96 kg/m²          Patient has an advanced directive:  Not on file     Mr. Jorge Koenig has a reminder for a \"due or due soon\" health maintenance. I have asked that he contact his primary care provider for follow-up on this health maintenance. Physical Examination: General appearance - alert, well appearing, and in no distress,  Chest - clear to auscultation bilaterally  Heart - normal rate, regular rhythm, normal S1, S2, no murmurs, rubs, clicks or gallops  Abdomen - soft, nontender, nondistended  scars from previous incisions healing without erythema or induration    A/P    Doing well 4 weeks status post laparoscopic Gastric Bypass  Diet advanced to soft foods for the next week then if tolerating soft well, he may advance to soft meats. . Slow down eating. You may only be able to take 3 bites and those bites might take 30 minutes. No drinking 30 minutes after eating. Focus on 50-60 grams of protein daily. Encouraged water intake to 64 oz of non-carbonated/no calorie beverages daily. Supplement with unflavored protein powder daily. Start Pepcid and New prescription sent to the pharmacy for Pr-[address]. No lifting greater than 40 lbs.     Follow up in 2 weeks. He has RTW without issue. Pt verbalized understanding and questions were answered to the best of my knowledge and ability.       Catherine Aguilar NP

## 2019-08-22 NOTE — PATIENT INSTRUCTIONS
Constipation: Care Instructions  Your Care Instructions    Constipation means that you have a hard time passing stools (bowel movements). People pass stools from 3 times a day to once every 3 days. What is normal for you may be different. Constipation may occur with pain in the rectum and cramping. The pain may get worse when you try to pass stools. Sometimes there are small amounts of bright red blood on toilet paper or the surface of stools. This is because of enlarged veins near the rectum (hemorrhoids). A few changes in your diet and lifestyle may help you avoid ongoing constipation. Your doctor may also prescribe medicine to help loosen your stool. Some medicines can cause constipation. These include pain medicines and antidepressants. Tell your doctor about all the medicines you take. Your doctor may want to make a medicine change to ease your symptoms. Follow-up care is a key part of your treatment and safety. Be sure to make and go to all appointments, and call your doctor if you are having problems. It's also a good idea to know your test results and keep a list of the medicines you take. How can you care for yourself at home? · Drink plenty of fluids, enough so that your urine is light yellow or clear like water. If you have kidney, heart, or liver disease and have to limit fluids, talk with your doctor before you increase the amount of fluids you drink. · Include high-fiber foods in your diet each day. These include fruits, vegetables, beans, and whole grains. · Get at least 30 minutes of exercise on most days of the week. Walking is a good choice. You also may want to do other activities, such as running, swimming, cycling, or playing tennis or team sports. · Take a fiber supplement, such as Citrucel or Metamucil, every day. Read and follow all instructions on the label. · Schedule time each day for a bowel movement. A daily routine may help.  Take your time having your bowel movement. · Support your feet with a small step stool when you sit on the toilet. This helps flex your hips and places your pelvis in a squatting position. · Your doctor may recommend an over-the-counter laxative to relieve your constipation. Examples are Milk of Magnesia and MiraLax. Read and follow all instructions on the label. Do not use laxatives on a long-term basis. When should you call for help? Call your doctor now or seek immediate medical care if:    · You have new or worse belly pain.     · You have new or worse nausea or vomiting.     · You have blood in your stools.    Watch closely for changes in your health, and be sure to contact your doctor if:    · Your constipation is getting worse.     · You do not get better as expected. Where can you learn more? Go to http://alirio-cuca.info/. Enter 21 [phone] in the search box to learn more about \"Constipation: Care Instructions. \"  Current as of: September 23, 2018  Content Version: 12.1  © 5792-9974 [employer]. Care instructions adapted under license by [employer] (which disclaims liability or warranty for this information). If you have questions about a medical condition or this instruction, always ask your healthcare professional. [address] any warranty or liability for your use of this information.

## 2019-08-22 NOTE — PROGRESS NOTES
Scott Varela  Body composition    male  64 y.o. Vitals:    08/22/19 0938   BP: 110/70   Pulse: (!) 56   Resp: 16   Temp: 97.6 °F (36.4 °C)   TempSrc: Oral   SpO2: 99%   Weight: 243 lb 8 oz (110.5 kg)   Height: 5' 9\" (1.753 m)     Body mass index is 35.96 kg/m². 1. Have you been to the ER, urgent care clinic since your last visit? Hospitalized since your last visit? No    2. Have you seen or consulted any other health care providers outside of the [address] [place] [address] since your last visit? Include any pap smears or colon screening.   No     H&P weight on 7/8/19 was 255 pounds  Last office visit weight on 8/8/19 was 243 pounds

## 2019-09-05 ENCOUNTER — OFFICE VISIT (OUTPATIENT)
Dept: SURGERY | Age: 59
End: 2019-09-05

## 2019-09-05 VITALS
DIASTOLIC BLOOD PRESSURE: 77 MMHG | RESPIRATION RATE: 18 BRPM | WEIGHT: 230 LBS | BODY MASS INDEX: 34.07 KG/M2 | HEART RATE: 53 BPM | HEIGHT: 69 IN | OXYGEN SATURATION: 98 % | SYSTOLIC BLOOD PRESSURE: 125 MMHG | TEMPERATURE: 98.4 F

## 2019-09-05 DIAGNOSIS — Z09 FOLLOW-UP EXAMINATION AFTER ABDOMINAL SURGERY: Primary | ICD-10-CM

## 2019-09-05 DIAGNOSIS — K91.2 POSTOPERATIVE INTESTINAL MALABSORPTION: ICD-10-CM

## 2019-09-05 NOTE — PATIENT INSTRUCTIONS
Add another protein shake in the afternoon between lunch and dinner     A little bit of planning goes a long way towards your success     Protein bars:  Quest, Pure protein, Rx, Think, Think Thin, Power Crunch     You may now advance to Regular Stage of the Gastric  Bypass diet, adding fresh produce (fruits and vegetables). This is outlined in detail in your education book. Choose foods wisely. Think about the nutritional benefit of the food. Protein first and at each meal.  Include produce (vegetables and/or fruits) at each meal.    Limit or eliminate \"filler\" foods such as breads, pasta, potatoes, rice, crackers, pretzels, and the like. Avoid eating and drinking together, there just isn't enough room! You may continue protein supplementation if needed to meet your daily protein goals. Many patients use a protein shake or bar to replace a meal.  There are a variety of protein supplements listed in your education book. Remember, the dietician is available for additional support. For an appointment, simply call or e-mail Vickie Nguyen RD at [phone] or [email or web address] to Phone. Take your vitamins every day, attend support group and keep your regular follow-up appointments. Ultimately, success depends on you. Choose to use your tool and we will guide you along the way!

## 2019-09-05 NOTE — PROGRESS NOTES
Chief Complaint   Patient presents with    Surgical Follow-up     6wks s/p gastric bypass, down 25lbs       Josselin GUAMAN Omelia Burkitt is 6 weeks status post Malabsorptive gastric bypass for treatment of morbid obesity . Presents today for obesity management. Patient has lost 25 lbs. Since surgery. Patient is satisfied with progress. Patient is consuming about 45-50 grams of protein daily. Drinks at least 1 shake per day   Patient is drinking 60+ oz of fluids per day. Bowels moving most days    Constipated  no  Using laxatives no  Nausea  no  Regurgitation  Few episodes and usually dinner meal or leftovers   No fever or chills, chest pain or shortness of breath. Vitamin compliance yes  Activity  Walking and referee for football   Sleep   Ok  Not checking BS \"like I should\" but \"when I check it is around 160\"  Physical Exam  Visit Vitals  /77 (BP 1 Location: Left arm, BP Patient Position: Sitting)   Pulse (!) 53   Temp 98.4 °F (36.9 °C) (Oral)   Resp 18   Ht 5' 9\" (1.753 m)   Wt 230 lb (104.3 kg)   SpO2 98%   BMI 33.97 kg/m²     A + O x 3  Chest  CTA, unlabored   COR  RRR  ABD Soft, obese, lap sites C/D/I well healed; NT/ND, no masses or hernias   EXT No edema; ambulating independently       ICD-10-CM ICD-9-CM    1. Follow-up examination after abdominal surgery Z09 V67.09    2. BMI 33.0-33.9,adult Z68.33 V85.33    3. 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 Diet, activity and mindfulness educational materials were provided. 17 minutes spent in face to face with Josselin Mcintyre > 50% counseling.

## 2019-09-05 NOTE — PROGRESS NOTES
1. Have you been to the ER, urgent care clinic since your last visit? Hospitalized since your last visit? No    2. Have you seen or consulted any other health care providers outside of the [address] [place] [address] since your last visit? Include any pap smears or colon screening.  No

## 2019-10-17 ENCOUNTER — OFFICE VISIT (OUTPATIENT)
Dept: SURGERY | Age: 59
End: 2019-10-17

## 2019-10-17 VITALS
WEIGHT: 221 LBS | SYSTOLIC BLOOD PRESSURE: 131 MMHG | HEART RATE: 41 BPM | RESPIRATION RATE: 16 BRPM | BODY MASS INDEX: 32.73 KG/M2 | TEMPERATURE: 98.2 F | OXYGEN SATURATION: 96 % | DIASTOLIC BLOOD PRESSURE: 74 MMHG | HEIGHT: 69 IN

## 2019-10-17 DIAGNOSIS — E53.8 B12 DEFICIENCY: ICD-10-CM

## 2019-10-17 DIAGNOSIS — E61.1 IRON DEFICIENCY: ICD-10-CM

## 2019-10-17 DIAGNOSIS — K91.2 POSTOPERATIVE INTESTINAL MALABSORPTION: ICD-10-CM

## 2019-10-17 DIAGNOSIS — Z09 FOLLOW-UP EXAMINATION AFTER ABDOMINAL SURGERY: Primary | ICD-10-CM

## 2019-10-17 DIAGNOSIS — E55.9 VITAMIN D DEFICIENCY: ICD-10-CM

## 2019-10-17 NOTE — PATIENT INSTRUCTIONS
Remember your stomach is about the size of an egg     If you \"think you are full\" you are done     Avoid reheated foods as they are too dry     Stay on your vitamins and  more multivitamins (no gummy, no silver and no men's vitamins as they have no iron)    Schedule an appointment with the dietician, please call or email   Cleo Chavez RD at:    [phone]  [email or web address]

## 2019-10-17 NOTE — LETTER
NOTIFICATION OF RETURN TO WORK / SCHOOL 
 
10/17/2019 9:50 AM 
 
Mr. Bello Sparks 
[address] 
[facility] [address] [place] To Whom It May Concern: 
 
Bello Sparks was under the care of Sukhdev Stuart from 7/25/19 to present. Please excuse from work 10/18/19. If there are questions or concerns please have the patient contact our office.  
 
Sincerely, 
 
 
Trista Bishop NP

## 2019-10-17 NOTE — PROGRESS NOTES
Chief Complaint   Patient presents with    Surgical Follow-up     3 months s/p gastric bypass, down 34lbs lost 9lbs       Josselin Aguayo is 3 months  status post Malabsorptive gastric bypass for treatment of morbid obesity   . Presents today for obesity management. Patient has lost 34 lbs. Since surgery. Patient is satisfied with progress. Patient is consuming about 40 grams of protein daily. Patient is drinking 32 oz of fluids per day. Bowels moving most days if he uses Miralax   Doesn't like taking it every day   Nausea  prn  Regurgitation  If drinks with meals   No meal prep and usually omn the go   No fever or chills, chest pain or shortness of breath. Vitamin compliance Most of the time   Activity  no  Sleep   poor  Physical Exam  Visit Vitals  /74 (BP 1 Location: Left arm, BP Patient Position: Sitting)   Pulse (!) 41   Temp 98.2 °F (36.8 °C) (Oral)   Resp 16   Ht 5' 9\" (1.753 m)   Wt 221 lb (100.2 kg)   SpO2 96%   BMI 32.64 kg/m²     A + O x 3  Chest  CTA, unlabored   COR  RRR  ABD Soft, obese, lap sites well healed; NT/ND, no masses or hernias   EXT No edema; ambulating independently       ICD-10-CM ICD-9-CM    1. Follow-up examination after abdominal surgery Z09 V67.09    2. Postoperative intestinal malabsorption K91.2 579.3    3. BMI 32.0-32.9,adult Z68.32 V85.32    4. Vitamin D deficiency E55.9 268.9    5. B12 deficiency E53.8 266.2    6. Iron deficiency E61.1 280.9        Josslein Aguayo is 3 months s/p Malabsorptive gastric bypass for treatment of morbid obesity   Labs today   Diet protein and produce   Meal prep   RD visit   Continue vitamins and protein supplements   Activity daily walking 10 minutes every hour   Sleep hygiene reviewed   Follow-up in 3 months  Support group  Giovany Palumbo verbalized understanding and questions were answered to the best of my knowledge and ability. Diet, activity and mindfulness educational materials were provided.     14 minutes spent in face to face with Christie Weems A  > 50% counseling.

## 2019-10-17 NOTE — PROGRESS NOTES
1. Have you been to the ER, urgent care clinic since your last visit? Hospitalized since your last visit? No    2. Have you seen or consulted any other health care providers outside of the [address] [place] [address] since your last visit? Include any pap smears or colon screening.  No

## 2020-01-17 ENCOUNTER — OFFICE VISIT (OUTPATIENT)
Dept: SURGERY | Age: 60
End: 2020-01-17

## 2020-01-17 VITALS
TEMPERATURE: 98.1 F | OXYGEN SATURATION: 97 % | DIASTOLIC BLOOD PRESSURE: 80 MMHG | RESPIRATION RATE: 16 BRPM | WEIGHT: 211 LBS | SYSTOLIC BLOOD PRESSURE: 125 MMHG | BODY MASS INDEX: 31.25 KG/M2 | HEART RATE: 77 BPM | HEIGHT: 69 IN

## 2020-01-17 DIAGNOSIS — E55.9 VITAMIN D DEFICIENCY: ICD-10-CM

## 2020-01-17 DIAGNOSIS — K91.2 POSTOPERATIVE INTESTINAL MALABSORPTION: Primary | ICD-10-CM

## 2020-01-17 DIAGNOSIS — E61.1 IRON DEFICIENCY: ICD-10-CM

## 2020-01-17 DIAGNOSIS — E53.8 B12 DEFICIENCY: ICD-10-CM

## 2020-01-17 DIAGNOSIS — K91.2 POSTOPERATIVE INTESTINAL MALABSORPTION: ICD-10-CM

## 2020-01-17 RX ORDER — METFORMIN HYDROCHLORIDE 1000 MG/1
TABLET ORAL
COMMUNITY
Start: 2019-02-26 | End: 2020-02-26

## 2020-01-17 RX ORDER — CYANOCOBALAMIN 1000 UG/ML
1000 INJECTION, SOLUTION INTRAMUSCULAR; SUBCUTANEOUS ONCE
COMMUNITY

## 2020-01-17 RX ORDER — BISMUTH SUBSALICYLATE 262 MG
1 TABLET,CHEWABLE ORAL DAILY
COMMUNITY

## 2020-01-17 NOTE — PROGRESS NOTES
Chief Complaint   Patient presents with    Surgical Follow-up     6 months post lap gastric bypass down 44 lbs       Josselin Mendes Man is 6 months status post Malabsorptive gastric bypass for treatment of morbid obesity . Presents today for obesity management. Patient has lost 44 lbs. Since surgery. Patient is satisfied with progress. Patient is consuming about 45-55 grams of protein daily. Has a sweet tooth and goes for PB crackers at work   Little better with meal prep and planning     Patient is drinking 64 oz of fluids per day. Bowels moving  daily. 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 NO    Was an incisional hernia noted on exam?       NO      COMORBIDITY     SLEEP APNEA                 NO        GERD  (req.meds)           NO  HYPERLIPIDEMIA           NO  HYPERTENSION             NO       IF YES, # OF HTN MEDICATIONS 0  DIABETES                        YES      IF YES, 1 NON-INSULIN   0 INSULIN     Physical Exam  Visit Vitals  /80 (BP 1 Location: Left arm, BP Patient Position: Sitting)   Pulse 77   Temp 98.1 °F (36.7 °C) (Oral)   Resp 16   Ht 5' 9\" (1.753 m)   Wt 211 lb (95.7 kg)   SpO2 97%   BMI 31.16 kg/m²     A + O x 3  Chest  CTA, unlabored   COR  RRR  ABD Soft, obese, lap sites well healed; NT/ND, no masses or hernias   EXT No edema; ambulating independently       ICD-10-CM ICD-9-CM    1. Postoperative intestinal malabsorption K91.2 579.3 CBC W/O DIFF      VITAMIN B12 & FOLATE      VITAMIN D, 25 HYDROXY      METABOLIC PANEL, COMPREHENSIVE      IRON   2.  BMI 31.0-31.9,adult Z68.31 V85.31 CBC W/O DIFF      VITAMIN B12 & FOLATE      VITAMIN D, 25 HYDROXY      METABOLIC PANEL, COMPREHENSIVE      IRON   3. Iron deficiency E61.1 280.9 CBC W/O DIFF      VITAMIN B12 & FOLATE      VITAMIN D, 25 HYDROXY      METABOLIC PANEL, COMPREHENSIVE      IRON   4. B12 deficiency E53.8 266.2 CBC W/O DIFF      VITAMIN B12 & FOLATE      VITAMIN D, 25 HYDROXY      METABOLIC PANEL, COMPREHENSIVE      IRON   5. Vitamin D deficiency E55.9 268.9 CBC W/O DIFF      VITAMIN B12 & FOLATE      VITAMIN D, 25 HYDROXY      METABOLIC PANEL, COMPREHENSIVE      IRON       Josselin Pritchard Cho is 6 months s/p Malabsorptive gastric bypass for treatment of morbid obesity  doing well   Diet protein and produce   Try apple slices and 1 tbsp natural PB for \"sweet' snack   Agree with restarting metformin    Continue vitamins and protein supplements   Activity daily walking 10 minutes every hour   Sleep hygiene reviewed   Follow-up in 3 months  [facility]   Support group  Giovany Palumbo verbalized understanding and questions were answered to the best of my knowledge and ability. Diet, activity and mindfulness educational materials were provided. 16 minutes spent in face to face with Josselin Mcintyre > 50% counseling.

## 2020-01-17 NOTE — PATIENT INSTRUCTIONS
Avoid ALL NSAID'S, including:  Advil, Motrin, Aleve, BC Powders, Excedrin, Naproxen, Ibuprofen, Goodies, Voltaren, Mobic, Diclofenac, etc.   
 
If you are given a new prescription for pain ask if it is a NSAID (non-steroidal antiinflammatory drug). If it is, you should not take after having gastric bypass. Tylenol products are ok Try taking apples and peanut butter to work vs nab crackers For your back foam roll 10-15 minutes every day, check your desk set up at work Acute Low Back Pain: Exercises Introduction Here are some examples of typical rehabilitation exercises for your condition. Start each exercise slowly. Ease off the exercise if you start to have pain. Your doctor or physical therapist will tell you when you can start these exercises and which ones will work best for you. When you are not being active, find a comfortable position for rest. Some people are comfortable on the floor or a medium-firm bed with a small pillow under their head and another under their knees. Some people prefer to lie on their side with a pillow between their knees. Don't stay in one position for too long. Take short walks (10 to 20 minutes) every 2 to 3 hours. Avoid slopes, hills, and stairs until you feel better. Walk only distances you can manage without pain, especially leg pain. How to do the exercises Back stretches 1. Get down on your hands and knees on the floor. 2. Relax your head and allow it to droop. Round your back up toward the ceiling until you feel a nice stretch in your upper, middle, and lower back. Hold this stretch for as long as it feels comfortable, or about 15 to 30 seconds. 3. Return to the starting position with a flat back while you are on your hands and knees. 4. Let your back sway by pressing your stomach toward the floor. Lift your buttocks toward the ceiling. 5. Hold this position for 15 to 30 seconds. 6. Repeat 2 to 4 times.  
 
Follow-up care is a key part of your treatment and safety. Be sure to make and go to all appointments, and call your doctor if you are having problems. It's also a good idea to know your test results and keep a list of the medicines you take. Where can you learn more? Go to http://alirio-cuca.info/. Enter H483 in the search box to learn more about \"Acute Low Back Pain: Exercises. \" Current as of: June 26, 2019 Content Version: 12.2 © 0816-3839 [employer]. Care instructions adapted under license by [employer] (which disclaims liability or warranty for this information). If you have questions about a medical condition or this instruction, always ask your healthcare professional. [address] any warranty or liability for your use of this information. Low Back Pain: Exercises Introduction Here are some examples of exercises for you to try. The exercises may be suggested for a condition or for rehabilitation. Start each exercise slowly. Ease off the exercises if you start to have pain. You will be told when to start these exercises and which ones will work best for you. How to do the exercises Press-up 7. Lie on your stomach, supporting your body with your forearms. 8. Press your elbows down into the floor to raise your upper back. As you do this, relax your stomach muscles and allow your back to arch without using your back muscles. As your press up, do not let your hips or pelvis come off the floor. 9. Hold for 15 to 30 seconds, then relax. 10. Repeat 2 to 4 times. Alternate arm and leg (bird dog) exercise 1. Start on the floor, on your hands and knees. 2. Tighten your belly muscles. 3. Raise one leg off the floor, and hold it straight out behind you. Be careful not to let your hip drop down, because that will twist your trunk. 4. Hold for about 6 seconds, then lower your leg and switch to the other leg. 5. Repeat 8 to 12 times on each leg.  
6. Over time, work up to holding for 10 to 30 seconds each time. 7. If you feel stable and secure with your leg raised, try raising the opposite arm straight out in front of you at the same time. Knee-to-chest exercise 1. Lie on your back with your knees bent and your feet flat on the floor. 2. Bring one knee to your chest, keeping the other foot flat on the floor (or keeping the other leg straight, whichever feels better on your lower back). 3. Keep your lower back pressed to the floor. Hold for at least 15 to 30 seconds. 4. Relax, and lower the knee to the starting position. 5. Repeat with the other leg. Repeat 2 to 4 times with each leg. 6. To get more stretch, put your other leg flat on the floor while pulling your knee to your chest. 
 
Curl-ups 1. Lie on the floor on your back with your knees bent at a 90-degree angle. Your feet should be flat on the floor, about 12 inches from your buttocks. 2. Cross your arms over your chest. If this bothers your neck, try putting your hands behind your neck (not your head), with your elbows spread apart. 3. Slowly tighten your belly muscles and raise your shoulder blades off the floor. 4. Keep your head in line with your body, and do not press your chin to your chest. 
5. Hold this position for 1 or 2 seconds, then slowly lower yourself back down to the floor. 6. Repeat 8 to 12 times. Pelvic tilt exercise 1. Lie on your back with your knees bent. 2. \"Brace\" your stomach. This means to tighten your muscles by pulling in and imagining your belly button moving toward your spine. You should feel like your back is pressing to the floor and your hips and pelvis are rocking back. 3. Hold for about 6 seconds while you breathe smoothly. 4. Repeat 8 to 12 times. Heel dig bridging 1. Lie on your back with both knees bent and your ankles bent so that only your heels are digging into the floor. Your knees should be bent about 90 degrees.  
2. Then push your heels into the floor, squeeze your buttocks, and lift your hips off the floor until your shoulders, hips, and knees are all in a straight line. 3. Hold for about 6 seconds as you continue to breathe normally, and then slowly lower your hips back down to the floor and rest for up to 10 seconds. 4. Do 8 to 12 repetitions. Hamstring stretch in doorway 1. Lie on your back in a doorway, with one leg through the open door. 2. Slide your leg up the wall to straighten your knee. You should feel a gentle stretch down the back of your leg. 3. Hold the stretch for at least 15 to 30 seconds. Do not arch your back, point your toes, or bend either knee. Keep one heel touching the floor and the other heel touching the wall. 4. Repeat with your other leg. 5. Do 2 to 4 times for each leg. Hip flexor stretch 1. Kneel on the floor with one knee bent and one leg behind you. Place your forward knee over your foot. Keep your other knee touching the floor. 2. Slowly push your hips forward until you feel a stretch in the upper thigh of your rear leg. 3. Hold the stretch for at least 15 to 30 seconds. Repeat with your other leg. 4. Do 2 to 4 times on each side. Wall sit 1. Stand with your back 10 to 12 inches away from a wall. 2. Lean into the wall until your back is flat against it. 3. Slowly slide down until your knees are slightly bent, pressing your lower back into the wall. 4. Hold for about 6 seconds, then slide back up the wall. 5. Repeat 8 to 12 times. Follow-up care is a key part of your treatment and safety. Be sure to make and go to all appointments, and call your doctor if you are having problems. It's also a good idea to know your test results and keep a list of the medicines you take. Where can you learn more? Go to http://alirio-cuca.info/. Enter Y209 in the search box to learn more about \"Low Back Pain: Exercises. \" Current as of: June 26, 2019 Content Version: 12.2 © 8458-5196 [employer]. Care instructions adapted under license by [employer] (which disclaims liability or warranty for this information). If you have questions about a medical condition or this instruction, always ask your healthcare professional. [address] any warranty or liability for your use of this information.

## 2020-01-17 NOTE — PROGRESS NOTES
1. Have you been to the ER, urgent care clinic since your last visit? Hospitalized since your last visit? No    2. Have you seen or consulted any other health care providers outside of the [address] [place] [address] since your last visit? Include any pap smears or colon screening.  No

## 2020-04-16 ENCOUNTER — TELEPHONE (OUTPATIENT)
Dept: SURGERY | Age: 60
End: 2020-04-16

## 2020-04-17 ENCOUNTER — OFFICE VISIT (OUTPATIENT)
Dept: SURGERY | Age: 60
End: 2020-04-17

## 2020-04-17 VITALS — BODY MASS INDEX: 28.8 KG/M2 | WEIGHT: 195 LBS

## 2020-04-17 DIAGNOSIS — K91.2 POSTOPERATIVE INTESTINAL MALABSORPTION: Primary | ICD-10-CM

## 2020-04-17 DIAGNOSIS — E66.01 MORBID OBESITY (HCC): ICD-10-CM

## 2020-04-17 DIAGNOSIS — K08.89 TOOTH ACHE: ICD-10-CM

## 2020-04-17 NOTE — PATIENT INSTRUCTIONS
Glad you are doing ok and was nice to chat with you. Happy belated birthday! remember Avoid ALL NSAID'S, including:  Advil, Motrin, Aleve, BC Powders, Excedrin, Naproxen, Ibuprofen, Goodies, Voltaren, Mobic, Diclofenac, etc.      If you are given a new prescription for pain ask if it is a NSAID (non-steroidal antiinflammatory drug). If it is, you should not take after having gastric bypass. You can use Tylenol as directed on the bottle     Try clove oil for the tooth.   Soak a cotton roll in the oil and place around the tooth for comfort (also antimicrobial)     Stay on your vitamins and aim to eat protein every meal     Stay safe and plan on follow up in 3 months

## 2020-04-17 NOTE — PROGRESS NOTES
I was in the office while conducting this encounter. Consent:  He and/or his healthcare decision maker is aware that this patient-initiated Telehealth encounter is a billable service, with coverage as determined by his insurance carrier. He is aware that he may receive a bill and has provided verbal consent to proceed: Yes    This virtual visit was conducted via [employer]. Pursuant to the emergency declaration under the [facility] waiver authority and the [employer] and [employer] Act, this Virtual  Visit was conducted to reduce the patient's risk of exposure to COVID-19 and provide continuity of care for an established patient. Services were provided through a video synchronous discussion virtually to substitute for in-person clinic visit. Due to this being a TeleHealth evaluation, many elements of the physical examination are unable to be assessed. Total Time: minutes: 5-10 minutes. Chief Complaint   Patient presents with    Surgical Follow-up     9 months s/p Malabsorptive gastric bypass [phone]. Jose Tillman is 9 months status post Malabsorptive gastric bypass for treatment of morbid obesity . Presents virtually today for obesity management. Patient has lost 76 lbs. Since surgery. Patient is satisfied with progress.     He has relocated to [place] for a new job   His only complaint is a tooth ache and he cannot get in with the dentist    Has been taking Tylenol (didn't help) and using Oragel with some relief   No fevers or chills   Doing ok with eating   Occasional regurgitation due to eating too much too fast   BM regular   Does not check blood sugars   Vitamin compliance yes  Activity  Walking   Sleep   Ok     Physical Exam  Visit Vitals  Wt 195 lb (88.5 kg)   BMI 28.80 kg/m²     A + O x 3, looks well   Speech clear   Breathing regular and unlabored   Walking about in his office while on the visit ICD-10-CM ICD-9-CM    1. Postoperative intestinal malabsorption K91.2 579.3    2. BMI 28.0-28.9,adult Z68.28 V85.24    3. [facility] ([facility] E66.01 278.01    4. Tooth ache K08.89 525.9          Josselin Kline is 9 months  s/p Malabsorptive gastric bypass for treatment of morbid obesity  doing well   Diet protein and produce   Reminded avoid NSAIDS   Can continue with Tylenol as directed and topicals, can also add clove oil (saturate cotton roll and place along tooth)   Continue vitamins and protein supplements   Activity daily walking 10 minutes every hour   Sleep hygiene reviewed   Follow-up in 3 months / virtual since he has relocated [facility] Resources verbalized understanding and questions were answered to the best of my knowledge and ability. Diet, activity and mindfulness educational materials were reviewed     Reviewed medication list, problem list and allergies. 7.33 minutes spent via virtual platform with patient in directed conversation with the patient to include medical condition, assessment and plan.

## 2020-04-17 NOTE — PROGRESS NOTES
1. Have you been to the ER, urgent care clinic since your last visit? Hospitalized since your last visit? No    2. Have you seen or consulted any other health care providers outside of the [address] [place] [address] since your last visit? Include any pap smears or colon screening.  No

## 2020-07-17 ENCOUNTER — TELEPHONE (OUTPATIENT)
Dept: SURGERY | Age: 60
End: 2020-07-17

## 2020-07-20 ENCOUNTER — OFFICE VISIT (OUTPATIENT)
Dept: SURGERY | Age: 60
End: 2020-07-20

## 2020-07-20 VITALS — BODY MASS INDEX: 29.62 KG/M2 | HEIGHT: 69 IN | WEIGHT: 200 LBS

## 2020-07-20 DIAGNOSIS — E66.01 MORBID OBESITY (HCC): Primary | ICD-10-CM

## 2020-07-20 DIAGNOSIS — K91.2 POSTOPERATIVE INTESTINAL MALABSORPTION: ICD-10-CM

## 2020-07-20 RX ORDER — METFORMIN HYDROCHLORIDE 1000 MG/1
1000 TABLET ORAL 2 TIMES DAILY
COMMUNITY

## 2020-07-20 NOTE — PROGRESS NOTES
I was in the office while conducting this encounter. Consent:  He and/or his healthcare decision maker is aware that this patient-initiated Telehealth encounter is a billable service, with coverage as determined by his insurance carrier. He is aware that he may receive a bill and has provided verbal consent to proceed: Yes    This virtual visit was conducted via [employer]. Pursuant to the emergency declaration under the [facility], [facility] waiver authority and the [employer] Act, this Virtual  Visit was conducted to reduce the patient's risk of exposure to COVID-19 and provide continuity of care for an established patient. Services were provided through a video synchronous discussion virtually to substitute for in-person clinic visit. Due to this being a TeleHealth evaluation, many elements of the physical examination are unable to be assessed. Total Time: minutes: 5-10 minutes. Chief Complaint   Patient presents with    Weight Loss     1 year s/p Bypass  Down 76lbs. Gained 5lbs. [address] is 1 year status post Malabsorptive gastric bypass for treatment of morbid obesity . Presents virtually today for obesity management. Patient has lost 76 lbs. Since surgery. Patient is satisfied with progress. [place] 197 lbs   Weight has started to stabilize   Patient is consuming about 45-55 grams of protein daily. Fat intake is high    Patient is drinking 52+ oz of fluids per day. Bowels moving most days    C/o a lot of gas   Constipated  no  Using laxatives no  Nausea  no  Regurgitation  rare  Vitamin compliance yes  Activity  Strength training and walking   Sleep   Good  Was just assigned PCP in [place]       Co-Morbid(s)     Was anti coagulation initiated for presumed / confirmed DVT/PE?     NO    Was an incisional hernia noted on exam?       NO      COMORBIDITY     SLEEP APNEA                 NO GERD  (req.meds)           NO  HYPERLIPIDEMIA           NO  HYPERTENSION             NO       IF YES, # OF HTN MEDICATIONS 0  DIABETES                        YES      IF YES, 1 NON-INSULIN   0 INSULIN     Physical Exam  Visit Vitals  Ht 5' 9\" (1.753 m)   Wt 200 lb (90.7 kg)   BMI 29.53 kg/m²     A + O x 3, looks well   Speech clear and breathing unlabored   Abdomen is well healed   ambulating independently       ICD-10-CM ICD-9-CM    1. Morbid obesity ([facility] [address]  E66.01 278.01    2. BMI 29.0-29.9,adult  Z68.29 V85.25    3. Postoperative intestinal malabsorption  K91.2 579.3        Josselin Douglas David is 1 year  s/p Malabsorptive gastric bypass for treatment of morbid obesity  doing well   73% excess weight loss   Diet protein and produce   Meal prep and avoid snacking   Decrease fat intake as suspect culprit for excess gas   Continue vitamins and protein supplements  Labs when sees PCP    Activity daily walking 10 minutes every hour   Sleep hygiene reviewed   Follow-up in 6 months (virtual)   [address] verbalized understanding and questions were answered to the best of my knowledge and ability. Diet, activity and mindfulness educational materials were provided. Reviewed medication list, problem list and allergies. 9 minutes spent virtually with patient in directed conversation with the patient to include medical condition, assessment and plan.

## 2020-07-20 NOTE — PROGRESS NOTES
1. Have you been to the ER, urgent care clinic since your last visit? Hospitalized since your last visit? No    2. Have you seen or consulted any other health care providers outside of the [address] [place] [address] since your last visit? Include any pap smears or colon screening.  No

## 2020-07-21 NOTE — PATIENT INSTRUCTIONS
Labs requested when you see your new PCP     CBC, CMP, Iron profile, B12 and folate, Vitamin D, 25     You can upload into the patient portal and if you need an order from me let me know     Stay on your vitamins     Make a virtual visit for 6 months     Schedule an appointment with the dietician, please call or email   Piper Nunn RD at:[email or web address] to long term weight loss / management     -  Sleep is critical and aim for 7 hours / night     -  Drink mostly water and lots of it     -  Eat \"clean\" and avoid processed foods (ie eating out of a box or bag)     -  Move every day = walk, swim, bike, yard work, etc     -  Journal what you eat (this is a proven tool to keep us on track)

## 2021-05-26 ENCOUNTER — TELEPHONE (OUTPATIENT)
Dept: SURGERY | Age: 61
End: 2021-05-26

## 2021-08-03 PROBLEM — E78.5 HYPERLIPIDEMIA: Status: RESOLVED | Noted: 2021-08-03 | Resolved: 2021-08-03

## 2022-03-19 PROBLEM — E66.9 OBESITY, CLASS II, BMI 35-39.9: Status: ACTIVE | Noted: 2018-08-21

## 2022-03-19 PROBLEM — E66.01 SEVERE OBESITY (HCC): Status: ACTIVE | Noted: 2019-07-08

## 2022-03-19 PROBLEM — E66.01 SEVERE OBESITY (BMI 35.0-35.9 WITH COMORBIDITY) (HCC): Status: ACTIVE | Noted: 2019-07-25

## 2022-06-02 ENCOUNTER — TELEPHONE (OUTPATIENT)
Dept: SURGERY | Age: 62
End: 2022-06-02

## 2023-05-23 RX ORDER — CYANOCOBALAMIN 1000 UG/ML
1000 INJECTION, SOLUTION INTRAMUSCULAR; SUBCUTANEOUS ONCE
COMMUNITY

## 2023-05-23 RX ORDER — ACETAMINOPHEN 500 MG
1000 TABLET ORAL EVERY 6 HOURS PRN
COMMUNITY

## 2023-05-23 RX ORDER — POLYETHYLENE GLYCOL 3350 17 G/17G
17 POWDER, FOR SOLUTION ORAL DAILY
COMMUNITY
Start: 2019-07-08

## (undated) DEVICE — DEVICE WND CLSR ABSRB

## (undated) DEVICE — 34" SINGLE PATIENT USE HOVERMATT BREATHABLE: Brand: SINGLE PATIENT USE HOVERMATT

## (undated) DEVICE — Device

## (undated) DEVICE — MEDI-VAC NON-CONDUCTIVE SUCTION TUBING: Brand: CARDINAL HEALTH

## (undated) DEVICE — FILTER SMK EVAC FLO CLR MEGADYNE

## (undated) DEVICE — GARMENT,MEDLINE,DVT,INT,CALF,MED, GEN2: Brand: MEDLINE

## (undated) DEVICE — WOUND RETRACTOR AND PROTECTOR: Brand: ALEXIS WOUND PROTECTOR-RETRACTOR

## (undated) DEVICE — SURGICAL PROCEDURE KIT GEN LAPAROSCOPY LF

## (undated) DEVICE — VISUALIZATION SYSTEM: Brand: CLEARIFY

## (undated) DEVICE — INFECTION CONTROL KIT SYS

## (undated) DEVICE — POWER SHELL: Brand: SIGNIA

## (undated) DEVICE — ELECTRODE ES 36CM LAP FLAT L HK COAT DISP CLEANCOAT

## (undated) DEVICE — DRAPE,REIN 53X77,STERILE: Brand: MEDLINE

## (undated) DEVICE — DRAIN CHN 19FR L0.25MM DIA6.3MM SIL RND HUBLESS FULL FLUT

## (undated) DEVICE — STAPLER INT AD L L25MM DIA12MM INTLUMN WHT TI CIR CUT 2 ROW

## (undated) DEVICE — LAP-BAND SYSTEM CALIBRATION TUBE: Brand: LAP-BAND

## (undated) DEVICE — Device: Brand: DEFENDO VALVE AND CONNECTOR KIT

## (undated) DEVICE — TUBING INSUFLTN 10FT LUER -- CONVERT TO ITEM 368568

## (undated) DEVICE — BLADELESS TROCAR WITH FIXATION CANNULA: Brand: VERSAPORT PLUS

## (undated) DEVICE — SYR IRR CATH TIP LR ADPT 70ML -- CONVERT TO ITEM 363120

## (undated) DEVICE — ENDO CARRY-ON PROCEDURE KIT INCLUDES ENZYMATIC SPONGE, GAUZE, BIOHAZARD LABEL, TRAY, LUBRICANT, DIRTY SCOPE LABEL, WATER LABEL, TRAY, DRAWSTRING PAD, AND DEFENDO 4-PIECE KIT.: Brand: ENDO CARRY-ON PROCEDURE KIT

## (undated) DEVICE — (D)PREP SKN CHLRAPRP APPL 26ML -- CONVERT TO ITEM 371833

## (undated) DEVICE — DRAPE,UTILTY,TAPE,15X26, 4EA/PK: Brand: MEDLINE

## (undated) DEVICE — BLADELESS OPTICAL TROCAR WITH FIXATION CANNULA: Brand: VERSAONE

## (undated) DEVICE — Z INACTIVE USE 2240337 DRAPE SURG PT TRANSFER TRAWAY SHT

## (undated) DEVICE — REM POLYHESIVE ADULT PATIENT RETURN ELECTRODE: Brand: VALLEYLAB

## (undated) DEVICE — BLADELESS OPTICAL TROCAR WITH FIXATION CANNULA: Brand: VERSAPORT

## (undated) DEVICE — TROCAR SITE CLOSURE DEVICE: Brand: ENDO CLOSE

## (undated) DEVICE — STERILE POLYISOPRENE POWDER-FREE SURGICAL GLOVES WITH EMOLLIENT COATING: Brand: PROTEXIS

## (undated) DEVICE — CLICKLINE SCISSORS INSERT: Brand: CLICKLINE

## (undated) DEVICE — STRAP,POSITIONING,KNEE/BODY,FOAM,4X60": Brand: MEDLINE

## (undated) DEVICE — APPLIER LIG CLP 5MM CONTAIN 16 TI CLP DISP ENDO CLP

## (undated) DEVICE — 3000CC GUARDIAN II: Brand: GUARDIAN

## (undated) DEVICE — SUTURE MCRYL SZ 4-0 L27IN ABSRB UD L19MM PS-2 1/2 CIR PRIM Y426H

## (undated) DEVICE — SUTURE V-LOC 180 SZ 3-0 L6IN ABSRB VLT CV-23 L17MM 1/2 CIR VLOCM0804

## (undated) DEVICE — SUTURE SZ 0 27IN 5/8 CIR UR-6  TAPER PT VIOLET ABSRB VICRYL J603H

## (undated) DEVICE — SOLUTION IV 1000ML 0.9% SOD CHL

## (undated) DEVICE — SUTURE VCRL SZ 3-0 L27IN ABSRB VLT L26MM SH 1/2 CIR J316H

## (undated) DEVICE — UNIVERSAL FIXATION CANNULA: Brand: VERSAONE

## (undated) DEVICE — DISSECTOR ULTRASONIC L48CM CRDLSS W/ TORQ WRNCH SONICISION

## (undated) DEVICE — NEEDLE HYPO 22GA L1.5IN BLK S STL HUB POLYPR SHLD REG BVL

## (undated) DEVICE — REINFORCED INTELLIGENT RELOAD, FOR USE WITH SIGNIA STAPLING SYSTEM: Brand: TRI-STAPLE 2.0

## (undated) DEVICE — ARTICULATING RELOAD WITH TRI-STAPLE TECHNOLOGY: Brand: ENDO GIA

## (undated) DEVICE — GOWN,SIRUS,FABRNF,XL,20/CS: Brand: MEDLINE

## (undated) DEVICE — SPECIMEN RETRIEVAL POUCH: Brand: ENDO CATCH GOLD

## (undated) DEVICE — STAPLER INT W25MM WHT TRNSOR CIR ANVIL W/ ADVANCING PROX

## (undated) DEVICE — SUT ETHLN 2-0 18IN FS BLK --

## (undated) DEVICE — APPLICATOR BNDG 1MM ADH PREMIERPRO EXOFIN

## (undated) DEVICE — 1200 GUARD II KIT W/5MM TUBE W/O VAC TUBE: Brand: GUARDIAN